# Patient Record
Sex: MALE | Race: OTHER | NOT HISPANIC OR LATINO | Employment: OTHER | URBAN - METROPOLITAN AREA
[De-identification: names, ages, dates, MRNs, and addresses within clinical notes are randomized per-mention and may not be internally consistent; named-entity substitution may affect disease eponyms.]

---

## 2018-04-12 ENCOUNTER — OFFICE VISIT (OUTPATIENT)
Dept: SLEEP CENTER | Facility: CLINIC | Age: 58
End: 2018-04-12
Payer: COMMERCIAL

## 2018-04-12 VITALS
BODY MASS INDEX: 35.28 KG/M2 | HEART RATE: 60 BPM | SYSTOLIC BLOOD PRESSURE: 138 MMHG | HEIGHT: 70 IN | WEIGHT: 246.4 LBS | DIASTOLIC BLOOD PRESSURE: 80 MMHG

## 2018-04-12 DIAGNOSIS — R06.83 SNORING: ICD-10-CM

## 2018-04-12 DIAGNOSIS — G25.81 RESTLESS LEG SYNDROME: Primary | ICD-10-CM

## 2018-04-12 DIAGNOSIS — E66.09 CLASS 2 OBESITY DUE TO EXCESS CALORIES WITHOUT SERIOUS COMORBIDITY WITH BODY MASS INDEX (BMI) OF 35.0 TO 35.9 IN ADULT: ICD-10-CM

## 2018-04-12 PROBLEM — E66.812 CLASS 2 OBESITY DUE TO EXCESS CALORIES WITHOUT SERIOUS COMORBIDITY WITH BODY MASS INDEX (BMI) OF 35.0 TO 35.9 IN ADULT: Status: ACTIVE | Noted: 2018-04-12

## 2018-04-12 PROCEDURE — 99215 OFFICE O/P EST HI 40 MIN: CPT | Performed by: PSYCHIATRY & NEUROLOGY

## 2018-04-12 RX ORDER — ROPINIROLE 0.25 MG/1
0.25 TABLET, FILM COATED ORAL 4 TIMES DAILY
Qty: 360 TABLET | Refills: 3 | Status: SHIPPED | OUTPATIENT
Start: 2018-04-12 | End: 2018-04-12 | Stop reason: SDUPTHER

## 2018-04-12 RX ORDER — SULFAMETHOXAZOLE AND TRIMETHOPRIM 800; 160 MG/1; MG/1
1 TABLET ORAL DAILY
Refills: 5 | COMMUNITY
Start: 2018-04-06 | End: 2019-04-30 | Stop reason: ALTCHOICE

## 2018-04-12 RX ORDER — EPINEPHRINE 0.3 MG/.3ML
INJECTION SUBCUTANEOUS
COMMUNITY
Start: 2011-03-24

## 2018-04-12 RX ORDER — CLOTRIMAZOLE AND BETAMETHASONE DIPROPIONATE 10; .64 MG/G; MG/G
CREAM TOPICAL
Refills: 5 | COMMUNITY
Start: 2018-01-22

## 2018-04-12 RX ORDER — UREA 10 %
0.5 LOTION (ML) TOPICAL
COMMUNITY

## 2018-04-12 RX ORDER — ROPINIROLE 0.25 MG/1
TABLET, FILM COATED ORAL
COMMUNITY
Start: 2015-09-28 | End: 2018-04-12 | Stop reason: SDUPTHER

## 2018-04-12 RX ORDER — IBUPROFEN 800 MG/1
800 TABLET ORAL EVERY 8 HOURS
COMMUNITY
Start: 2017-05-15 | End: 2021-03-18 | Stop reason: HOSPADM

## 2018-04-12 RX ORDER — ROPINIROLE 0.25 MG/1
0.25 TABLET, FILM COATED ORAL 4 TIMES DAILY
Qty: 360 TABLET | Refills: 0 | Status: SHIPPED | OUTPATIENT
Start: 2018-04-12 | End: 2019-08-15 | Stop reason: SDUPTHER

## 2018-04-12 RX ORDER — MOMETASONE FUROATE 50 UG/1
SPRAY, METERED NASAL
Refills: 3 | COMMUNITY
Start: 2018-04-06 | End: 2019-04-30

## 2018-04-12 RX ORDER — POTASSIUM BICARBONATE 25 MEQ/1
25 TABLET, EFFERVESCENT ORAL
COMMUNITY
End: 2022-04-14 | Stop reason: ALTCHOICE

## 2018-04-12 RX ORDER — BETAMETHASONE DIPROPIONATE 0.5 MG/G
CREAM TOPICAL
Refills: 5 | COMMUNITY
Start: 2018-03-01

## 2018-04-12 RX ORDER — SILODOSIN 8 MG/1
8 CAPSULE ORAL
COMMUNITY
End: 2019-04-30 | Stop reason: ALTCHOICE

## 2018-04-12 RX ORDER — TRIAMCINOLONE ACETONIDE 1 MG/G
CREAM TOPICAL
Refills: 5 | COMMUNITY
Start: 2018-01-22

## 2018-04-12 RX ORDER — MOMETASONE FUROATE 50 UG/1
2 SPRAY, METERED NASAL DAILY
COMMUNITY
Start: 2011-03-24 | End: 2019-04-30 | Stop reason: SDUPTHER

## 2018-04-12 RX ORDER — LISINOPRIL AND HYDROCHLOROTHIAZIDE 12.5; 1 MG/1; MG/1
1 TABLET ORAL DAILY
Refills: 3 | COMMUNITY
Start: 2018-01-29 | End: 2019-04-30 | Stop reason: ALTCHOICE

## 2018-04-12 RX ORDER — CLOBETASOL PROPIONATE 0.46 MG/ML
SOLUTION TOPICAL
COMMUNITY
Start: 2015-02-06

## 2018-04-12 NOTE — PATIENT INSTRUCTIONS
1   Continue ropinirole at 0 25 mg 3 or 4 times a day as needed  2  Continue mandibular advancement device for control of snoring  3  Attempt weight loss as possible  4  Continue melatonin at 0 5 mg at nighttime to control mild insomnia  5  Look for any evidence of augmentation  6    Contact the [facility] if any new problems arise

## 2018-04-12 NOTE — PROGRESS NOTES
Progress Note - [facility] YESENIA Murray 59 y o  male   :1960, MRN: [id number]  2018          Follow Up Evaluation / Problem:     Cap Restless Legs Syndrome  Snoring  Obesity    HPI: John Andrew is a 59y o  year old male  He has a history of restless legs syndrome which has been well controlled using ropinirole  He currently uses 0 25 mg tablets  He takes 3 tablets bedtime and may take an extra tablet during the day if necessary      Review of Systems      Genitourinary frequent urination, frequent urination at night and difficulity emptying your bladder   Cardiology none   Gastrointestinal none   Neurology none   Constitutional weight change of 10 or more pounds in the past year gain of 15 pounds   Integumentary none   Psychiatry none   Musculoskeletal joint pain   Pulmonary none   ENT nasal or sinus congestion and ringing in ears   Endocrine none   Hematological easy brusing       Current Outpatient Prescriptions:     aspirin 81 MG tablet, Take 1 tablet by mouth daily, Disp: , Rfl:     betamethasone dipropionate (DIPROSONE) 0 05 % cream, APPLY EVERY DAY TO FOOT, Disp: , Rfl: 5    Cholecalciferol (VITAMIN D PO), Take 1 tablet by mouth, Disp: , Rfl:     clotrimazole-betamethasone (LOTRISONE) 1-0 05 % cream, APPLY TWICE A DAY, Disp: , Rfl: 5    Coenzyme Q10 (COQ10 PO), Take by mouth, Disp: , Rfl:     EPINEPHrine (EPIPEN) 0 3 mg/0 3 mL SOAJ, Inject as directed, Disp: , Rfl:     fexofenadine (ALLEGRA) 30 MG/5ML suspension, Take 30 mg by mouth, Disp: , Rfl:     halobetasol (ULTRAVATE) 0 05 % cream, APPLY TWICE A DAY, Disp: , Rfl: 5    ibuprofen (MOTRIN) 800 mg tablet, Take 800 mg by mouth every 8 (eight) hours, Disp: , Rfl:     lisinopril-hydrochlorothiazide (PRINZIDE,ZESTORETIC) 10-12 5 MG per tablet, Take 1 tablet by mouth daily, Disp: , Rfl: 3    melatonin 1 mg, Take 0 5 mg by mouth  , Disp: , Rfl:     mometasone (NASONEX) 50 mcg/act nasal spray, USE 2 SPRAYS AT BEDTIME AS DIRECTED, Disp: , Rfl: 3    mometasone (NASONEX) 50 mcg/act nasal spray, 2 sprays into each nostril daily, Disp: , Rfl:     Multiple Vitamins-Minerals (OCUVITE ADULT 50+ PO), Take by mouth, Disp: , Rfl:     NON FORMULARY, , Disp: , Rfl:     Omega-3 Fatty Acids (OMEGA 3 PO), Take by mouth, Disp: , Rfl:     potassium bicarbonate (K-LYTE) 25 MEQ disintegrating tablet, Take 25 mEq by mouth, Disp: , Rfl:     rOPINIRole (REQUIP) 0 25 mg tablet, Take 1 tablet (0 25 mg total) by mouth 4 (four) times a day for 90 days, Disp: 360 tablet, Rfl: 0    Silodosin (RAPAFLO) 8 MG CAPS, Take 8 mg by mouth, Disp: , Rfl:     triamcinolone (KENALOG) 0 1 % cream, APPLY TWICE A DAY, Disp: , Rfl: 5    Tuberculin-Allergy Syringes (B-D ALLERGY SYRINGE 1CC/28G) 28G X 1/2" 1 ML MISC, USE 3 SYRINGES A WEEK, Disp: , Rfl:     clobetasol (TEMOVATE) 0 05 % external solution, Apply topically, Disp: , Rfl:     DOCOSAHEXAENOIC ACID PO, Take 2 capsules by mouth, Disp: , Rfl:     sulfamethoxazole-trimethoprim (BACTRIM DS) 800-160 mg per tablet, Take 1 tablet by mouth daily, Disp: , Rfl: 5    [place] Sleepiness Scale  Sitting and reading: Slight chance of dozing  Watching TV: Slight chance of dozing  Sitting, inactive in a public place (e g  a theatre or a meeting): Would never doze  As a passenger in a car for an hour without a break: Would never doze  Lying down to rest in the afternoon when circumstances permit: Slight chance of dozing  Sitting and talking to someone: Would never doze  Sitting quietly after a lunch without alcohol: Would never doze  In a car, while stopped for a few minutes in traffic: Would never doze  Total score: 3              Vitals:    04/12/18 0800   BP: 138/80   Pulse: 60   Weight: 112 kg (246 lb 6 4 oz)   Height: 5' 10" (1 778 m)       Body mass index is 35 35 kg/m²      Neck Circumference: 43 (cm)       EPWORTH SLEEPINESS SCORE  Total score: 3      Past History Since Last Sleep Center Visit:    He has continued to have excellent results with ropinirole as described above  He denies any new difficulties  He recently had an injection of his left heel for chronic pain  This seems to work quite well  Snoring continues to be fairly well controlled using an over-the-counter mandibular advancement device  He has found it necessary to change this device every 6-12 months  The review of systems and following portions of the patient's history were reviewed and updated as appropriate: allergies, current medications, past family history, past medical history, past social history, past surgical history, and problem list     OBJECTIVE      Physical Exam:     General Appearance:   Alert, cooperative, no distress, appears stated age, obese     Head:   Normocephalic, without obvious abnormality, atraumatic     Eyes:   PERRL, conjunctiva/corneas clear, EOM's intact          Nose:  Nares normal, septum midline, no drainage or sinus tenderness           Throat:  Lips, teeth and gums normal; tongue normal size and  shape and midline       Neck:  Supple, symmetrical, trachea midline, no adenopathy; Thyroid: No enlargement, tenderness or nodules; no carotid bruit or JVD     Lungs:      Clear to auscultation bilaterally, respirations unlabored     Heart:   Regular rate and rhythm, S1 and S2 normal, no murmur, rub or gallop       Extremities:  Extremities normal, atraumatic, no cyanosis or edema     Pulses:  2+ and symmetric all extremities     Skin:  Skin color, texture, turgor normal, no rashes or lesions       Neurologic:  CNII-XII intact  Normal strength, sensation throughout       ASSESSMENT / PLAN    1  Restless leg syndrome  rOPINIRole (REQUIP) 0 25 mg tablet    DISCONTINUED: rOPINIRole (REQUIP) 0 25 mg tablet   2  Snoring     3   Class 2 obesity due to excess calories without serious comorbidity with body mass index (BMI) of 35 0 to 35 9 in adult             Counseling / Coordination of Care  Total clinic time spent today 25 minutes  Greater than 50% of total time was spent with the patient and / or family counseling and / or coordination of care  A description of the counseling / coordination of care:     Impressions, Prognosis, Instructions for management, Risks and benefits of treatment and Importance of compliance with treatment    The following instructions have been given to the patient today:    Patient Instructions   1  Continue ropinirole at 0 25 mg 3 or 4 times a day as needed  2  Continue mandibular advancement device for control of snoring  3  Attempt weight loss as possible  4  Continue melatonin at 0 5 mg at nighttime to control mild insomnia  5  Look for any evidence of augmentation  6    Contact the [facility] if any new problems arise        Hever Chavarria

## 2018-04-20 ENCOUNTER — TRANSCRIBE ORDERS (OUTPATIENT)
Dept: ADMINISTRATIVE | Facility: HOSPITAL | Age: 58
End: 2018-04-20

## 2018-04-20 DIAGNOSIS — N20.1 CALCULUS OF URETER: Primary | ICD-10-CM

## 2018-04-28 ENCOUNTER — HOSPITAL ENCOUNTER (OUTPATIENT)
Dept: CT IMAGING | Facility: HOSPITAL | Age: 58
Discharge: HOME | End: 2018-04-28
Attending: UROLOGY
Payer: COMMERCIAL

## 2018-04-28 DIAGNOSIS — N20.1 CALCULUS OF URETER: ICD-10-CM

## 2018-04-28 PROCEDURE — 74176 CT ABD & PELVIS W/O CONTRAST: CPT

## 2019-08-15 ENCOUNTER — OFFICE VISIT (OUTPATIENT)
Dept: SLEEP CENTER | Facility: CLINIC | Age: 59
End: 2019-08-15
Payer: COMMERCIAL

## 2019-08-15 VITALS
WEIGHT: 245 LBS | SYSTOLIC BLOOD PRESSURE: 118 MMHG | BODY MASS INDEX: 35.07 KG/M2 | HEIGHT: 70 IN | HEART RATE: 74 BPM | DIASTOLIC BLOOD PRESSURE: 76 MMHG

## 2019-08-15 DIAGNOSIS — E66.09 CLASS 2 OBESITY DUE TO EXCESS CALORIES WITHOUT SERIOUS COMORBIDITY WITH BODY MASS INDEX (BMI) OF 35.0 TO 35.9 IN ADULT: Primary | ICD-10-CM

## 2019-08-15 DIAGNOSIS — G25.81 RESTLESS LEG SYNDROME: ICD-10-CM

## 2019-08-15 PROCEDURE — 99215 OFFICE O/P EST HI 40 MIN: CPT | Performed by: PSYCHIATRY & NEUROLOGY

## 2019-08-15 RX ORDER — ROPINIROLE 0.25 MG/1
0.25 TABLET, FILM COATED ORAL 4 TIMES DAILY
Qty: 360 TABLET | Refills: 3 | Status: SHIPPED | OUTPATIENT
Start: 2019-08-15 | End: 2020-08-04

## 2019-08-15 NOTE — PATIENT INSTRUCTIONS
1  Continue ropinirole at current schedule  2  Contact the [facility] if any difficulties arise  3  Contact the [facility] for refills as needed  4   Return in approximately 1 year for routine re-evaluation

## 2019-08-15 NOTE — PROGRESS NOTES
Progress Note -  Nawaf Sol  59 y o  male   :1960, MRN: [id number]  8/15/2019          Follow Up Evaluation / Problem:     Restless Legs Syndrome  Snoring  Obesity      Thank you for the opportunity of participating in the evaluation and care of this patient in the Sleep Clinic at [facility]  HPI: Seth Ramires is a 59y o  year old male  He has a history of restless legs syndrome which has been well controlled using ropinirole over the past several years          Current Outpatient Medications:     alfuzosin (UROXATRAL) 10 mg 24 hr tablet, Take 10 mg by mouth daily, Disp: , Rfl:     aspirin 81 MG tablet, Take 1 tablet by mouth daily, Disp: , Rfl:     betamethasone dipropionate (DIPROSONE) 0 05 % cream, APPLY EVERY DAY TO FOOT, Disp: , Rfl: 5    Cholecalciferol (VITAMIN D PO), Take 1 tablet by mouth, Disp: , Rfl:     clobetasol (TEMOVATE) 0 05 % external solution, Apply topically, Disp: , Rfl:     clotrimazole-betamethasone (LOTRISONE) 1-0 05 % cream, APPLY TWICE A DAY, Disp: , Rfl: 5    Coenzyme Q10 (COQ10 PO), Take by mouth, Disp: , Rfl:     EPINEPHrine (EPIPEN) 0 3 mg/0 3 mL SOAJ, Inject as directed, Disp: , Rfl:     fexofenadine (ALLEGRA) 30 MG/5ML suspension, Take 30 mg by mouth, Disp: , Rfl:     halobetasol (ULTRAVATE) 0 05 % cream, APPLY TWICE A DAY, Disp: , Rfl: 5    ibuprofen (MOTRIN) 800 mg tablet, Take 800 mg by mouth every 8 (eight) hours, Disp: , Rfl:     lisinopril (ZESTRIL) 10 mg tablet, Take 10 mg by mouth daily, Disp: , Rfl:     melatonin 1 mg, Take 0 5 mg by mouth  , Disp: , Rfl:     mometasone (ELOCON) 0 1 % lotion, One drop affected ear canal daily at bedtime when necessary itching, Disp: 60 mL, Rfl: 0    mometasone (NASONEX) 50 mcg/act nasal spray, 1 spray into each nostril 2 (two) times a day for 360 days, Disp: 3 Act, Rfl: 3    Multiple Vitamins-Minerals (OCUVITE ADULT 50+ PO), Take by mouth, Disp: , Rfl:     NON FORMULARY, , Disp: , Rfl:     Olopatadine HCl 0 6 % SOLN, 1-2 sprays each nostril twice a day when necessary nasal congestion, Disp: 1 Bottle, Rfl: 5    Omega-3 1000 MG CAPS, Take by mouth, Disp: , Rfl:     potassium bicarbonate (K-LYTE) 25 MEQ disintegrating tablet, Take 25 mEq by mouth, Disp: , Rfl:     triamcinolone (KENALOG) 0 1 % cream, APPLY TWICE A DAY, Disp: , Rfl: 5    Tuberculin-Allergy Syringes (B-D ALLERGY SYRINGE 1CC/28G) 28G X 1/2" 1 ML MISC, USE 3 SYRINGES A WEEK, Disp: , Rfl:     rOPINIRole (REQUIP) 0 25 mg tablet, Take 1 tablet (0 25 mg total) by mouth 4 (four) times a day for 90 days, Disp: 360 tablet, Rfl: 0    [place] Sleepiness Scale  Sitting and reading: Would never doze  Watching TV: Slight chance of dozing  Sitting, inactive in a public place (e g  a theatre or a meeting): Would never doze  As a passenger in a car for an hour without a break: Would never doze  Lying down to rest in the afternoon when circumstances permit: Would never doze  Sitting and talking to someone: Would never doze  Sitting quietly after a lunch without alcohol: Would never doze  In a car, while stopped for a few minutes in traffic: Would never doze  Total score: 1              Vitals:    08/15/19 0800   BP: 118/76   Pulse: 74   Weight: 111 kg (245 lb)   Height: 5' 10" (1 778 m)       Body mass index is 35 15 kg/m²  Neck Circumference: 17       EPWORTH SLEEPINESS SCORE  Total score: 1      Past History Since Last Sleep Center Visit:     Still doing well on ropinnirole  He currently takes 0 25 milligram tablets 3-4 times daily as needed  He is having no difficulty with his legs of present  He will continue with same dose of medication the time being  He is experiencing no evidence of side effects at this time      The review of systems and following portions of the patient's history were reviewed and updated as appropriate: allergies, current medications, past family history, past medical history, past social history, past surgical history, and problem list         OBJECTIVE      Physical Exam:     General Appearance:   Alert, cooperative, no distress, appears stated age     Head:   Normocephalic, without obvious abnormality, atraumatic     Eyes:   PERRL, conjunctiva/corneas clear, EOM's intact          Nose:  Nares normal, septum midline, no drainage or sinus tenderness     Neck:  Supple, symmetrical, trachea midline, no adenopathy; Thyroid: No enlargement, tenderness or nodules; no carotid bruit or JVD       Extremities:  Extremities normal, atraumatic, no cyanosis or edema     Pulses:  2+ and symmetric all extremities     Skin:  Skin color, texture, turgor normal, no rashes or lesions       Neurologic:  CNII-XII intact  Normal strength, sensation throughout       ASSESSMENT / PLAN    No diagnosis found  Counseling / Coordination of Care  Total clinic time spent today 25 minutes  Greater than 50% of total time was spent with the patient and / or family counseling and / or coordination of care  A description of the counseling / coordination of care:     Impressions, Prognosis, Instructions for management, Risks and benefits of treatment, Patient and family education and Risk factor reductions    The following instructions have been given to the patient today:    There are no Patient Instructions on file for this visit        Hever Hollis

## 2019-08-15 NOTE — LETTER
August 15, 2019     Roya Carson DO  [address] [id number]    Patient: John Andrew   YOB: 1960   Date of Visit: 8/15/2019       Dear Dr Sandro Valente: Thank you for referring Yulisa Dunne to me for evaluation  Below are my notes for this consultation  If you have questions, please do not hesitate to call me  I look forward to following your patient along with you           Sincerely,        Sophie Ordaz DO        CC: No Recipients

## 2020-08-01 DIAGNOSIS — G25.81 RESTLESS LEG SYNDROME: ICD-10-CM

## 2020-08-04 RX ORDER — ROPINIROLE 0.25 MG/1
TABLET, FILM COATED ORAL
Qty: 360 TABLET | Refills: 3 | Status: SHIPPED | OUTPATIENT
Start: 2020-08-04 | End: 2022-01-31 | Stop reason: SDUPTHER

## 2021-03-10 DIAGNOSIS — Z23 ENCOUNTER FOR IMMUNIZATION: ICD-10-CM

## 2021-03-12 RX ORDER — FINASTERIDE 5 MG/1
5 TABLET, FILM COATED ORAL DAILY
COMMUNITY
End: 2022-05-09 | Stop reason: ALTCHOICE

## 2021-03-12 RX ORDER — TAMSULOSIN HYDROCHLORIDE 0.4 MG/1
0.8 CAPSULE ORAL
Status: ON HOLD | COMMUNITY
End: 2022-05-17

## 2021-03-12 NOTE — PRE-PROCEDURE INSTRUCTIONS
Pre-Surgery Instructions:   Medication Instructions    alfuzosin (UROXATRAL) 10 mg 24 hr tablet Instructed patient per Anesthesia Guidelines   aspirin 81 MG tablet Patient was instructed by Physician and understands  stopped for surgery    betamethasone dipropionate (DIPROSONE) 0 05 % cream Instructed patient per Anesthesia Guidelines   Cholecalciferol (VITAMIN D PO) Instructed patient per Anesthesia Guidelines  stop 3/12    clobetasol (TEMOVATE) 0 05 % external solution Instructed patient per Anesthesia Guidelines   clotrimazole-betamethasone (LOTRISONE) 1-0 05 % cream Instructed patient per Anesthesia Guidelines   Coenzyme Q10 (COQ10 PO) Instructed patient per Anesthesia Guidelines  stop 3/12    EPINEPHrine (EPIPEN) 0 3 mg/0 3 mL SOAJ Instructed patient per Anesthesia Guidelines   fexofenadine (ALLEGRA) 30 MG/5ML suspension Instructed patient per Anesthesia Guidelines   finasteride (PROSCAR) 5 mg tablet Instructed patient per Anesthesia Guidelines   halobetasol (ULTRAVATE) 0 05 % cream Instructed patient per Anesthesia Guidelines   ibuprofen (MOTRIN) 800 mg tablet Instructed patient per Anesthesia Guidelines  stop 3/12    lisinopril (ZESTRIL) 10 mg tablet Instructed patient per Anesthesia Guidelines  HOLD 3/18    melatonin 1 mg Instructed patient per Anesthesia Guidelines  stop 3/12    mometasone (ELOCON) 0 1 % lotion Instructed patient per Anesthesia Guidelines   mometasone (NASONEX) 50 mcg/act nasal spray Instructed patient per Anesthesia Guidelines   NON FORMULARY Instructed patient per Anesthesia Guidelines   potassium bicarbonate (K-LYTE) 25 MEQ disintegrating tablet Instructed patient per Anesthesia Guidelines  stop 3/12    rOPINIRole (REQUIP) 0 25 mg tablet Instructed patient per Anesthesia Guidelines   tamsulosin (FLOMAX) 0 4 mg Instructed patient per Anesthesia Guidelines   triamcinolone (KENALOG) 0 1 % cream Instructed patient per Anesthesia Guidelines     Pre procedure instructions reviewed verbalizes understanding  NPO after MN  Stop all supplements 3/12  Morning meds with water  No NSAIDS

## 2021-03-17 ENCOUNTER — ANESTHESIA EVENT (OUTPATIENT)
Dept: PERIOP | Facility: HOSPITAL | Age: 61
End: 2021-03-17
Payer: COMMERCIAL

## 2021-03-18 ENCOUNTER — ANESTHESIA (OUTPATIENT)
Dept: PERIOP | Facility: HOSPITAL | Age: 61
End: 2021-03-18
Payer: COMMERCIAL

## 2021-03-18 ENCOUNTER — HOSPITAL ENCOUNTER (OUTPATIENT)
Facility: HOSPITAL | Age: 61
Setting detail: OUTPATIENT SURGERY
Discharge: HOME | End: 2021-03-18
Attending: UROLOGY | Admitting: UROLOGY
Payer: COMMERCIAL

## 2021-03-18 VITALS
DIASTOLIC BLOOD PRESSURE: 70 MMHG | SYSTOLIC BLOOD PRESSURE: 155 MMHG | RESPIRATION RATE: 18 BRPM | TEMPERATURE: 97.9 F | WEIGHT: 240 LBS | BODY MASS INDEX: 34.36 KG/M2 | HEART RATE: 80 BPM | HEIGHT: 70 IN | OXYGEN SATURATION: 98 %

## 2021-03-18 DIAGNOSIS — N40.1 BENIGN PROSTATIC HYPERPLASIA WITH WEAK URINARY STREAM: Primary | ICD-10-CM

## 2021-03-18 DIAGNOSIS — R39.12 BENIGN PROSTATIC HYPERPLASIA WITH WEAK URINARY STREAM: Primary | ICD-10-CM

## 2021-03-18 PROBLEM — I10 HTN (HYPERTENSION): Status: ACTIVE | Noted: 2021-03-18

## 2021-03-18 PROBLEM — N40.0 BPH (BENIGN PROSTATIC HYPERPLASIA): Status: ACTIVE | Noted: 2021-03-18

## 2021-03-18 PROCEDURE — L8699 PROSTHETIC IMPLANT NOS: HCPCS | Performed by: UROLOGY

## 2021-03-18 DEVICE — IMPLANT URO PROSTATE UROLIFT: Type: IMPLANTABLE DEVICE | Site: PROSTATE | Status: FUNCTIONAL

## 2021-03-18 RX ORDER — CIPROFLOXACIN 500 MG/1
500 TABLET, FILM COATED ORAL EVERY 12 HOURS SCHEDULED
Qty: 14 TABLET | Refills: 0 | Status: ON HOLD | OUTPATIENT
Start: 2021-03-18 | End: 2021-03-26

## 2021-03-18 RX ORDER — FENTANYL CITRATE 50 UG/ML
INJECTION, SOLUTION INTRAMUSCULAR; INTRAVENOUS AS NEEDED
Status: DISCONTINUED | OUTPATIENT
Start: 2021-03-18 | End: 2021-03-18

## 2021-03-18 RX ORDER — ONDANSETRON 2 MG/ML
INJECTION INTRAMUSCULAR; INTRAVENOUS AS NEEDED
Status: DISCONTINUED | OUTPATIENT
Start: 2021-03-18 | End: 2021-03-18

## 2021-03-18 RX ORDER — PROPOFOL 10 MG/ML
INJECTION, EMULSION INTRAVENOUS CONTINUOUS PRN
Status: DISCONTINUED | OUTPATIENT
Start: 2021-03-18 | End: 2021-03-18

## 2021-03-18 RX ORDER — MAGNESIUM HYDROXIDE 1200 MG/15ML
LIQUID ORAL AS NEEDED
Status: DISCONTINUED | OUTPATIENT
Start: 2021-03-18 | End: 2021-03-18 | Stop reason: HOSPADM

## 2021-03-18 RX ORDER — FENTANYL CITRATE/PF 50 MCG/ML
25 SYRINGE (ML) INJECTION
Status: DISCONTINUED | OUTPATIENT
Start: 2021-03-18 | End: 2021-03-18 | Stop reason: HOSPADM

## 2021-03-18 RX ORDER — PROPOFOL 10 MG/ML
INJECTION, EMULSION INTRAVENOUS AS NEEDED
Status: DISCONTINUED | OUTPATIENT
Start: 2021-03-18 | End: 2021-03-18

## 2021-03-18 RX ORDER — ONDANSETRON 2 MG/ML
4 INJECTION INTRAMUSCULAR; INTRAVENOUS EVERY 6 HOURS PRN
Status: DISCONTINUED | OUTPATIENT
Start: 2021-03-18 | End: 2021-03-18 | Stop reason: HOSPADM

## 2021-03-18 RX ORDER — ACETAMINOPHEN 325 MG/1
650 TABLET ORAL EVERY 6 HOURS PRN
Status: DISCONTINUED | OUTPATIENT
Start: 2021-03-18 | End: 2021-03-18 | Stop reason: HOSPADM

## 2021-03-18 RX ORDER — ONDANSETRON 2 MG/ML
4 INJECTION INTRAMUSCULAR; INTRAVENOUS ONCE AS NEEDED
Status: DISCONTINUED | OUTPATIENT
Start: 2021-03-18 | End: 2021-03-18 | Stop reason: HOSPADM

## 2021-03-18 RX ORDER — MIDAZOLAM HYDROCHLORIDE 2 MG/2ML
INJECTION, SOLUTION INTRAMUSCULAR; INTRAVENOUS AS NEEDED
Status: DISCONTINUED | OUTPATIENT
Start: 2021-03-18 | End: 2021-03-18

## 2021-03-18 RX ORDER — ATORVASTATIN CALCIUM 10 MG/1
10 TABLET, FILM COATED ORAL DAILY
COMMUNITY

## 2021-03-18 RX ORDER — SODIUM CHLORIDE 9 MG/ML
125 INJECTION, SOLUTION INTRAVENOUS CONTINUOUS
Status: DISCONTINUED | OUTPATIENT
Start: 2021-03-18 | End: 2021-03-18 | Stop reason: HOSPADM

## 2021-03-18 RX ORDER — KETOROLAC TROMETHAMINE 30 MG/ML
INJECTION, SOLUTION INTRAMUSCULAR; INTRAVENOUS AS NEEDED
Status: DISCONTINUED | OUTPATIENT
Start: 2021-03-18 | End: 2021-03-18

## 2021-03-18 RX ORDER — OXYCODONE HYDROCHLORIDE AND ACETAMINOPHEN 5; 325 MG/1; MG/1
1 TABLET ORAL EVERY 4 HOURS PRN
Status: DISCONTINUED | OUTPATIENT
Start: 2021-03-18 | End: 2021-03-18 | Stop reason: HOSPADM

## 2021-03-18 RX ORDER — FUROSEMIDE 10 MG/ML
INJECTION INTRAMUSCULAR; INTRAVENOUS AS NEEDED
Status: DISCONTINUED | OUTPATIENT
Start: 2021-03-18 | End: 2021-03-18

## 2021-03-18 RX ORDER — CIPROFLOXACIN 2 MG/ML
400 INJECTION, SOLUTION INTRAVENOUS ONCE
Status: COMPLETED | OUTPATIENT
Start: 2021-03-18 | End: 2021-03-18

## 2021-03-18 RX ADMIN — PROPOFOL 50 MG: 10 INJECTION, EMULSION INTRAVENOUS at 14:11

## 2021-03-18 RX ADMIN — FENTANYL CITRATE 50 MCG: 50 INJECTION, SOLUTION INTRAMUSCULAR; INTRAVENOUS at 14:17

## 2021-03-18 RX ADMIN — PROPOFOL 50 MG: 10 INJECTION, EMULSION INTRAVENOUS at 14:13

## 2021-03-18 RX ADMIN — SODIUM CHLORIDE 125 ML/HR: 0.9 INJECTION, SOLUTION INTRAVENOUS at 15:00

## 2021-03-18 RX ADMIN — FUROSEMIDE 10 MG: 10 INJECTION, SOLUTION INTRAMUSCULAR; INTRAVENOUS at 14:33

## 2021-03-18 RX ADMIN — KETOROLAC TROMETHAMINE 15 MG: 30 INJECTION, SOLUTION INTRAMUSCULAR at 14:33

## 2021-03-18 RX ADMIN — MIDAZOLAM 2 MG: 1 INJECTION INTRAMUSCULAR; INTRAVENOUS at 13:59

## 2021-03-18 RX ADMIN — SODIUM CHLORIDE 125 ML/HR: 0.9 INJECTION, SOLUTION INTRAVENOUS at 13:38

## 2021-03-18 RX ADMIN — ONDANSETRON 4 MG: 2 INJECTION INTRAMUSCULAR; INTRAVENOUS at 14:34

## 2021-03-18 RX ADMIN — SODIUM CHLORIDE 125 ML/HR: 0.9 INJECTION, SOLUTION INTRAVENOUS at 15:56

## 2021-03-18 RX ADMIN — FENTANYL CITRATE 25 MCG: 50 INJECTION, SOLUTION INTRAMUSCULAR; INTRAVENOUS at 14:39

## 2021-03-18 RX ADMIN — PROPOFOL 100 MCG/KG/MIN: 10 INJECTION, EMULSION INTRAVENOUS at 14:11

## 2021-03-18 RX ADMIN — FENTANYL CITRATE 25 MCG: 50 INJECTION, SOLUTION INTRAMUSCULAR; INTRAVENOUS at 14:29

## 2021-03-18 RX ADMIN — CIPROFLOXACIN 400 MG: 2 INJECTION, SOLUTION INTRAVENOUS at 13:58

## 2021-03-18 NOTE — DISCHARGE INSTRUCTIONS
Chambers Catheter Placement and Care   WHAT YOU NEED TO KNOW:   A Chambers catheter is a sterile tube that is inserted into your bladder to drain urine  It is also called an indwelling urinary catheter  DISCHARGE INSTRUCTIONS:   Return to the emergency department if:   · Your catheter comes out  · You suddenly have material that looks like sand in the tubing or drainage bag  · No urine is draining into the bag and you have checked the system  · You have pain in your hip, back, pelvis, or lower abdomen  · You are confused or cannot think clearly  Call your doctor or urologist if:   · You have a fever  · You have bladder spasms for more than 1 day after the catheter is placed  · You see blood in the tubing or drainage bag  · You have a rash or itching where the catheter tube is secured to your skin  · Urine leaks from or around the catheter, tubing, or drainage bag  · The closed drainage system has accidently come open or apart  · You see a layer of crystals inside the tubing  · You have questions or concerns about your condition or care  Care for your catheter and drainage bag: You can reduce your risk for infection and injury by caring for your catheter and drainage bag properly  · Wash your hands often  Wash before and after you touch your catheter, tubing, or drainage bag  Use soap and water  Wear clean disposable gloves when you care for your catheter or disconnect the drainage bag  Wash your hands before you prepare or eat food  · Clean your genital area 2 times every day  Clean your catheter area and anal opening after every bowel movement  ? For men:  Use a soapy cloth to clean the tip of your penis  Start where the catheter enters  Wipe backward making sure to pull back the foreskin  Then use a cloth with clear water in the same direction to clean away the soap  ?  For women:  Use a soapy cloth to clean the area that the catheter enters your body  Make sure to separate your labia and wipe toward the anus  Then use a cloth with clear water and wipe in the same direction  · Secure the catheter tube  so you do not pull or move the catheter  This helps prevent pain and bladder spasms  Healthcare providers will show you how to use medical tape or a strap to secure the catheter tube to your body  · Keep a closed drainage system  Your catheter should always be attached to the drainage bag to form a closed system  Do not disconnect any part of the closed system unless you need to change the bag  · Keep the drainage bag below the level of your waist   This helps stop urine from moving back up the tubing and into your bladder  Do not loop or kink the tubing  This can cause urine to back up and collect in your bladder  Do not let the drainage bag touch or lie on the floor  · Empty the drainage bag when needed  The weight of a full drainage bag can be painful  Empty the drainage bag every 3 to 6 hours or when it is ? full  · Clean and change the drainage bag as directed  Ask your healthcare provider how often you should change the drainage bag and what cleaning solution to use  Wear disposable gloves when you change the bag  Do not allow the end of the catheter or tubing to touch anything  Clean the ends with an alcohol pad before you reconnect them  What to do if problems develop:   · No urine is draining into the bag:      ? Check for kinks in the tubing and straighten them out  ? Check the tape or strap used to secure the catheter tube to your skin  Make sure it is not blocking the tube  ? Make sure you are not sitting or lying on the tubing  ? Make sure the urine bag is hanging below the level of your waist     · Urine leaks from or around the catheter, tubing, or drainage bag:  Check if the closed drainage system has accidently come open or apart   Clean the catheter and tubing ends with a new alcohol pad and reconnect them     Follow up with your doctor or urologist as directed:  Write down your questions so you remember to ask them during your visits  © Copyright [address] Information is for End User's use only and may not be sold, redistributed or otherwise used for commercial purposes  All illustrations and images included in CareNotes® are the copyrighted property of [employer] D [employer] M [employer]  or [employer]  The above information is an  only  It is not intended as medical advice for individual conditions or treatments  Talk to your doctor, nurse or pharmacist before following any medical regimen to see if it is safe and effective for you  Urinary Leg Bag   WHAT YOU NEED TO KNOW:   What is a urinary leg bag? A urinary leg bag holds urine that drains from your catheter  It fits under your clothes and allows you to do your normal daily activities  How do I use a urinary leg bag? · Wash your hands  before and after you touch your catheter, tubing, or drainage bag  Use soap and water  This reduces the risk of infection  · Strap your leg bag to your thigh or calf  Make sure the straps are comfortable  The straps can cause problems with blood flow in your leg if they are too tight  · Clean the tip of the drainage tube with alcohol  before attaching it to your catheter  This helps prevent bacteria from getting into your catheter  · The connecting tube should not pull on your catheter  Skin breakdown can occur if there is constant pulling on the catheter  · Check the tube often to make sure it is not kinked or twisted  Blockage in the tube can cause urine to back up into your bladder  Your urine must flow straight through the tube into your leg bag  · Always keep the leg bag below your bladder  This prevents urine from the bag going back into your bladder, which may cause an infection  · Empty your leg bag when it is ½ full, or every 3 hours    A full bag may break or disconnect from the catheter  · Change to your bedside bag before you go to bed  Your bedside bag can hold more urine  Do not use your leg bag at night because it could become too full or break  · Clean your leg bag after every use  Fill the bag with 2 parts vinegar and 3 parts water  Let it soak for 20 minutes, then rinse and let dry  Follow your healthcare provider's instruction on replacing your leg bag with a new one  CARE AGREEMENT:   You have the right to help plan your care  Learn about your health condition and how it may be treated  Discuss treatment options with your healthcare providers to decide what care you want to receive  You always have the right to refuse treatment  The above information is an  only  It is not intended as medical advice for individual conditions or treatments  Talk to your doctor, nurse or pharmacist before following any medical regimen to see if it is safe and effective for you  © Copyright [address] Information is for End User's use only and may not be sold, redistributed or otherwise used for commercial purposes  All illustrations and images included in CareNotes® are the copyrighted property of A D [employer] M [employer]  or [address] [place] [address] [email or web address]      How to Change a Catheter Drainage Bag   WHAT YOU NEED TO KNOW:   The catheter is attached to a drainage bag that holds the urine  A large drainage bag is usually used during the night  A leg bag can be worn under your clothes during the day  The leg bag is worn around your calf or thigh  It allows you to be in public without anyone knowing you have a catheter  A leg bag will have to be emptied frequently because it is not as big as the large bag  DISCHARGE INSTRUCTIONS:   Call your doctor if:   · Your catheter comes out  · You have a fever  · You have material that looks like sand in the tubing or drainage bag  · You see blood in the tubing or drainage bag      · You see a layer of crystals inside the tubing  · You have questions or concerns about your condition or care  Attach or remove a drainage bag: You use the same steps to attach or remove a large drainage bag and a leg bag  · Gather supplies:      ? Large drainage bag    ? Clean leg bag    ? A clean towel    ? Alcohol pads    ·          · Wash your hands with soap and water  for at least 20 seconds  · Empty the drainage bag  Do not touch the tip against the container or the toilet as you empty the urine  · Place the clean towel under the connection between the catheter tube and the drainage bag tubing  The towel will catch urine that may leak out of the tubing  · Pinch and hold the catheter tubing  Disconnect the tubing from the bag by using a twisting motion  Be careful not to pull on the catheter  Place the disconnected bag on the towel  · Clean the tip of the tubing on the bag to be connected  Use an alcohol pad  Start at the tip and clean towards the bag  You may have to hold the bag tubing in the same hand as the pinched catheter tubing while you are cleaning the tip  Do not  allow the tip to touch the catheter tubing  · Connect the bag tubing to the catheter tubing  Place the tip into the catheter tubing with a twisting motion until it is securely connected  · Use straps to fasten the bag to your calf  if you are attaching a leg bag  You may need to tape the catheter tubing to your thigh  Be careful not to pull the catheter tubing tight  Damage can be done to your urethra and bladder  · Wash your hands with soap and water  for at least 20 seconds  What you need to know about drainage bags:   · Wash your hands with soap and water before and after emptying the drainage bag  · Empty your drainage bag when it is half full throughout the day  Also empty the large drainage bag when you wake in the morning or from a nap  · Make sure to clamp or twist the drainage bag closed after emptying      · Put a cap on the end of the connection tubing to prevent germs in the tubing  Prevent problems with your catheter and drainage bag:   · Drink liquids as directed  Ask your healthcare provider how much liquid to drink each day and which liquids are best for you  Liquids will help flush your kidneys and bladder to help prevent infection  · Check for kinks in the tubing and straighten them out  · Check the tape or strap used to secure the catheter tube to your skin  Make sure it is not blocking the tube  · Make sure you are not sitting or lying on the tubing  · Make sure the urine bag is hanging below the level of your waist     Follow up with your doctor as directed:  Write down your questions so you remember to ask them during your visits  © Copyright [address] Information is for End User's use only and may not be sold, redistributed or otherwise used for commercial purposes  All illustrations and images included in CareNotes® are the copyrighted property of [employer] M [employer] Inc  or [address] [place], [place] [address]sanjiv   The above information is an  only  It is not intended as medical advice for individual conditions or treatments  Talk to your doctor, nurse or pharmacist before following any medical regimen to see if it is safe and effective for you

## 2021-03-18 NOTE — INTERVAL H&P NOTE
H&P reviewed  After examining the patient I find no changes in the patients condition since the H&P had been written      Vitals:    03/18/21 1333   BP: 157/96   Pulse: 90   Resp: 19   Temp: 97 9 °F (36 6 °C)   SpO2: 95%

## 2021-03-18 NOTE — ANESTHESIA PREPROCEDURE EVALUATION
Procedure:  CYSTOSCOPY WITH INSERTION UROLIFT (N/A Bladder)    Relevant Problems   CARDIO   (+) HTN (hypertension)      /RENAL   (+) BPH (benign prostatic hyperplasia)      Other   (+) Class 2 obesity due to excess calories without serious comorbidity with body mass index (BMI) of 35 0 to 35 9 in adult   (+) Restless leg syndrome   (+) Snoring        Physical Exam    Airway    Mallampati score: II  TM Distance: >3 FB  Neck ROM: full     Dental   No notable dental hx     Cardiovascular  Rhythm: regular, Rate: normal, Cardiovascular exam normal    Pulmonary  Pulmonary exam normal Breath sounds clear to auscultation,     Other Findings        Anesthesia Plan  ASA Score- 2     Anesthesia Type- IV sedation with anesthesia and general with ASA Monitors  Additional Monitors:   Airway Plan:           Plan Factors-    Chart reviewed  Patient summary reviewed  Patient is not a current smoker  Patient instructed to abstain from smoking on day of procedure  Patient did not smoke on day of surgery  Induction- intravenous  Postoperative Plan-     Informed Consent- Anesthetic plan and risks discussed with patient

## 2021-03-18 NOTE — OP NOTE
OPERATIVE REPORT    PATIENT NAME: Ary Gardiner    :  1960  MRN: [id number]  Pt Location: [facility] OR ROOM 01    SURGERY DATE:   3/18/2021  Surgeon(s) and Role:      DO Winston Hernandes Primary    Preop Diagnosis:  Benign prostatic hyperplasia with lower urinary tract symptoms [N40 1]  Post-Op Diagnosis Codes:     Benign prostatic hyperplasia with lower urinary tract symptoms [N40 1]  Procedure(s):  CYSTOSCOPY WITH INSERTION UROLIFT implants  Four implants were placed  Two distal to the bladder neck  Two proximal to the verumontanum    Specimen(s):  None    Estimated Blood Loss:   Minimal    Drains:  Urethral Catheter Non-latex 20 Fr   (Active)   Number of days: 0       Anesthesia Type:   General    Operative Indications:  Bladder outlet obstructive symptoms refractory to maximum dose of alpha blocker therapy    Operative Findings:  Enlarged lateral lobes of the prostate bilaterally    Complications:   None known    Procedure and Technique:  Patient was identified  General anesthesia was administered  Patient was positioned in dorsal lithotomy  Genitals were prepped and draped  Time-out was taken  Rigid cystoscopy was performed  Notable findings were lateral lobe enlargement bilaterally  Bladder appeared healthy otherwise  Urolift implantation was then commenced at the left bladder neck approximately 1 5 cm distal to the bladder neck  Identical procedure was done in mirror image fashion on the right side at a similar location  Attention was then directed to the left of this side of the verumontanum where the 3rd implant was placed slightly proximal to the verumontanum  Identical procedure was done on the right side in mirror-image fashion, this represented the 4th and final implant  Visualization was done and confirmed excellent relief of bladder outlet obstruction  Twenty Portuguese Chambers catheter was placed to gravity drainage and attached to a leg bag  Patient went to recovery room having tolerated the procedure well  Catheter will be left indwelling until he comes to the office for catheter removal on Monday 3/22/2021         I was present for the entire procedure    Patient Disposition:  PACU     SIGNATURE: Bridgette Gitelman, DO  DATE: March 18, 2021  TIME: 3:01 PM

## 2021-03-23 ENCOUNTER — HOSPITAL ENCOUNTER (INPATIENT)
Facility: HOSPITAL | Age: 61
LOS: 2 days | Discharge: HOME | DRG: 717 | End: 2021-03-26
Attending: EMERGENCY MEDICINE | Admitting: STUDENT IN AN ORGANIZED HEALTH CARE EDUCATION/TRAINING PROGRAM
Payer: COMMERCIAL

## 2021-03-23 ENCOUNTER — APPOINTMENT (EMERGENCY)
Dept: CT IMAGING | Facility: HOSPITAL | Age: 61
DRG: 717 | End: 2021-03-23
Payer: COMMERCIAL

## 2021-03-23 DIAGNOSIS — N40.1 BENIGN PROSTATIC HYPERPLASIA WITH WEAK URINARY STREAM: ICD-10-CM

## 2021-03-23 DIAGNOSIS — R31.9 HEMATURIA: ICD-10-CM

## 2021-03-23 DIAGNOSIS — R33.9 URINARY RETENTION: Primary | ICD-10-CM

## 2021-03-23 DIAGNOSIS — R39.12 BENIGN PROSTATIC HYPERPLASIA WITH WEAK URINARY STREAM: ICD-10-CM

## 2021-03-23 LAB
ANION GAP SERPL CALCULATED.3IONS-SCNC: 10 MMOL/L (ref 4–13)
BACTERIA UR QL AUTO: ABNORMAL /HPF
BASOPHILS # BLD AUTO: 0.06 THOUSANDS/ΜL (ref 0–0.1)
BASOPHILS NFR BLD AUTO: 1 % (ref 0–1)
BILIRUB UR QL STRIP: NEGATIVE
BUN SERPL-MCNC: 13 MG/DL (ref 5–25)
CALCIUM SERPL-MCNC: 8.9 MG/DL (ref 8.3–10.1)
CHLORIDE SERPL-SCNC: 104 MMOL/L (ref 100–108)
CLARITY UR: ABNORMAL
CO2 SERPL-SCNC: 26 MMOL/L (ref 21–32)
COLOR UR: ABNORMAL
CREAT SERPL-MCNC: 1.18 MG/DL (ref 0.6–1.3)
EOSINOPHIL # BLD AUTO: 0.16 THOUSAND/ΜL (ref 0–0.61)
EOSINOPHIL NFR BLD AUTO: 2 % (ref 0–6)
ERYTHROCYTE [DISTWIDTH] IN BLOOD BY AUTOMATED COUNT: 12.2 % (ref 11.6–15.1)
GFR SERPL CREATININE-BSD FRML MDRD: 66 ML/MIN/1.73SQ M
GLUCOSE SERPL-MCNC: 124 MG/DL (ref 65–140)
GLUCOSE UR STRIP-MCNC: ABNORMAL MG/DL
HCT VFR BLD AUTO: 46.8 % (ref 36.5–49.3)
HGB BLD-MCNC: 16 G/DL (ref 12–17)
HGB UR QL STRIP.AUTO: ABNORMAL
IMM GRANULOCYTES # BLD AUTO: 0.05 THOUSAND/UL (ref 0–0.2)
IMM GRANULOCYTES NFR BLD AUTO: 1 % (ref 0–2)
KETONES UR STRIP-MCNC: ABNORMAL MG/DL
LEUKOCYTE ESTERASE UR QL STRIP: ABNORMAL
LYMPHOCYTES # BLD AUTO: 1.05 THOUSANDS/ΜL (ref 0.6–4.47)
LYMPHOCYTES NFR BLD AUTO: 12 % (ref 14–44)
MCH RBC QN AUTO: 32.2 PG (ref 26.8–34.3)
MCHC RBC AUTO-ENTMCNC: 34.2 G/DL (ref 31.4–37.4)
MCV RBC AUTO: 94 FL (ref 82–98)
MONOCYTES # BLD AUTO: 0.61 THOUSAND/ΜL (ref 0.17–1.22)
MONOCYTES NFR BLD AUTO: 7 % (ref 4–12)
NEUTROPHILS # BLD AUTO: 7.12 THOUSANDS/ΜL (ref 1.85–7.62)
NEUTS SEG NFR BLD AUTO: 77 % (ref 43–75)
NITRITE UR QL STRIP: ABNORMAL
NON-SQ EPI CELLS URNS QL MICRO: ABNORMAL /HPF
NRBC BLD AUTO-RTO: 0 /100 WBCS
PH UR STRIP.AUTO: 7 [PH]
PLATELET # BLD AUTO: 152 THOUSANDS/UL (ref 149–390)
PMV BLD AUTO: 10.7 FL (ref 8.9–12.7)
POTASSIUM SERPL-SCNC: 4 MMOL/L (ref 3.5–5.3)
PROT UR STRIP-MCNC: >=300 MG/DL
RBC # BLD AUTO: 4.97 MILLION/UL (ref 3.88–5.62)
RBC #/AREA URNS AUTO: ABNORMAL /HPF
SODIUM SERPL-SCNC: 140 MMOL/L (ref 136–145)
SP GR UR STRIP.AUTO: 1.02 (ref 1–1.03)
UROBILINOGEN UR QL STRIP.AUTO: 1 E.U./DL
WBC # BLD AUTO: 9.05 THOUSAND/UL (ref 4.31–10.16)
WBC #/AREA URNS AUTO: ABNORMAL /HPF

## 2021-03-23 PROCEDURE — 36415 COLL VENOUS BLD VENIPUNCTURE: CPT | Performed by: PHYSICIAN ASSISTANT

## 2021-03-23 PROCEDURE — 3C1ZX8Z IRRIGATION OF INDWELLING DEVICE USING IRRIGATING SUBSTANCE, EXTERNAL APPROACH: ICD-10-PCS | Performed by: UROLOGY

## 2021-03-23 PROCEDURE — 0T9B70Z DRAINAGE OF BLADDER WITH DRAINAGE DEVICE, VIA NATURAL OR ARTIFICIAL OPENING: ICD-10-PCS | Performed by: UROLOGY

## 2021-03-23 PROCEDURE — 3C1ZX8Z IRRIGATION OF INDWELLING DEVICE USING IRRIGATING SUBSTANCE, EXTERNAL APPROACH: ICD-10-PCS | Performed by: EMERGENCY MEDICINE

## 2021-03-23 PROCEDURE — 85025 COMPLETE CBC W/AUTO DIFF WBC: CPT | Performed by: PHYSICIAN ASSISTANT

## 2021-03-23 PROCEDURE — 87086 URINE CULTURE/COLONY COUNT: CPT | Performed by: PHYSICIAN ASSISTANT

## 2021-03-23 PROCEDURE — 74178 CT ABD&PLV WO CNTR FLWD CNTR: CPT

## 2021-03-23 PROCEDURE — 99220 PR INITIAL OBSERVATION CARE/DAY 70 MINUTES: CPT | Performed by: STUDENT IN AN ORGANIZED HEALTH CARE EDUCATION/TRAINING PROGRAM

## 2021-03-23 PROCEDURE — 80048 BASIC METABOLIC PNL TOTAL CA: CPT | Performed by: PHYSICIAN ASSISTANT

## 2021-03-23 PROCEDURE — 81001 URINALYSIS AUTO W/SCOPE: CPT | Performed by: PHYSICIAN ASSISTANT

## 2021-03-23 PROCEDURE — 99285 EMERGENCY DEPT VISIT HI MDM: CPT

## 2021-03-23 PROCEDURE — 99285 EMERGENCY DEPT VISIT HI MDM: CPT | Performed by: PHYSICIAN ASSISTANT

## 2021-03-23 RX ORDER — SODIUM CHLORIDE 9 MG/ML
75 INJECTION, SOLUTION INTRAVENOUS CONTINUOUS
Status: DISCONTINUED | OUTPATIENT
Start: 2021-03-23 | End: 2021-03-26 | Stop reason: HOSPADM

## 2021-03-23 RX ORDER — ROPINIROLE 0.25 MG/1
0.75 TABLET, FILM COATED ORAL
Status: DISCONTINUED | OUTPATIENT
Start: 2021-03-23 | End: 2021-03-26 | Stop reason: HOSPADM

## 2021-03-23 RX ORDER — CIPROFLOXACIN 500 MG/1
500 TABLET, FILM COATED ORAL EVERY 12 HOURS SCHEDULED
Status: COMPLETED | OUTPATIENT
Start: 2021-03-23 | End: 2021-03-24

## 2021-03-23 RX ORDER — LANOLIN ALCOHOL/MO/W.PET/CERES
1.5 CREAM (GRAM) TOPICAL
Status: DISCONTINUED | OUTPATIENT
Start: 2021-03-23 | End: 2021-03-26 | Stop reason: HOSPADM

## 2021-03-23 RX ORDER — ATORVASTATIN CALCIUM 10 MG/1
10 TABLET, FILM COATED ORAL
Status: DISCONTINUED | OUTPATIENT
Start: 2021-03-23 | End: 2021-03-26 | Stop reason: HOSPADM

## 2021-03-23 RX ORDER — FLUTICASONE PROPIONATE 50 MCG
1 SPRAY, SUSPENSION (ML) NASAL DAILY
Status: DISCONTINUED | OUTPATIENT
Start: 2021-03-23 | End: 2021-03-26 | Stop reason: HOSPADM

## 2021-03-23 RX ORDER — OXYBUTYNIN CHLORIDE 5 MG/1
5 TABLET ORAL 3 TIMES DAILY
Status: DISCONTINUED | OUTPATIENT
Start: 2021-03-23 | End: 2021-03-26 | Stop reason: HOSPADM

## 2021-03-23 RX ORDER — FINASTERIDE 5 MG/1
5 TABLET, FILM COATED ORAL DAILY
Status: DISCONTINUED | OUTPATIENT
Start: 2021-03-23 | End: 2021-03-26 | Stop reason: HOSPADM

## 2021-03-23 RX ORDER — LORATADINE 10 MG/1
5 TABLET ORAL DAILY
Status: DISCONTINUED | OUTPATIENT
Start: 2021-03-23 | End: 2021-03-26 | Stop reason: HOSPADM

## 2021-03-23 RX ORDER — LISINOPRIL 10 MG/1
10 TABLET ORAL DAILY
Status: DISCONTINUED | OUTPATIENT
Start: 2021-03-23 | End: 2021-03-26 | Stop reason: HOSPADM

## 2021-03-23 RX ORDER — TAMSULOSIN HYDROCHLORIDE 0.4 MG/1
0.4 CAPSULE ORAL
Status: DISCONTINUED | OUTPATIENT
Start: 2021-03-23 | End: 2021-03-26 | Stop reason: HOSPADM

## 2021-03-23 RX ADMIN — CIPROFLOXACIN HYDROCHLORIDE 500 MG: 500 TABLET, FILM COATED ORAL at 13:47

## 2021-03-23 RX ADMIN — OXYBUTYNIN CHLORIDE 5 MG: 5 TABLET ORAL at 22:17

## 2021-03-23 RX ADMIN — OXYBUTYNIN CHLORIDE 5 MG: 5 TABLET ORAL at 10:40

## 2021-03-23 RX ADMIN — LISINOPRIL 10 MG: 10 TABLET ORAL at 13:47

## 2021-03-23 RX ADMIN — FLUTICASONE PROPIONATE 1 SPRAY: 50 SPRAY, METERED NASAL at 22:40

## 2021-03-23 RX ADMIN — CIPROFLOXACIN HYDROCHLORIDE 500 MG: 500 TABLET, FILM COATED ORAL at 22:10

## 2021-03-23 RX ADMIN — ATORVASTATIN CALCIUM 10 MG: 10 TABLET, FILM COATED ORAL at 16:49

## 2021-03-23 RX ADMIN — ROPINIROLE 0.75 MG: 0.25 TABLET, FILM COATED ORAL at 22:12

## 2021-03-23 RX ADMIN — LORATADINE 5 MG: 10 TABLET ORAL at 13:47

## 2021-03-23 RX ADMIN — SODIUM CHLORIDE 75 ML/HR: 0.9 INJECTION, SOLUTION INTRAVENOUS at 12:24

## 2021-03-23 RX ADMIN — MELATONIN 1.5 MG: at 22:11

## 2021-03-23 RX ADMIN — FINASTERIDE 5 MG: 5 TABLET, FILM COATED ORAL at 13:47

## 2021-03-23 RX ADMIN — IOHEXOL 100 ML: 350 INJECTION, SOLUTION INTRAVENOUS at 08:53

## 2021-03-23 RX ADMIN — OXYBUTYNIN CHLORIDE 5 MG: 5 TABLET ORAL at 16:49

## 2021-03-23 RX ADMIN — TAMSULOSIN HYDROCHLORIDE 0.4 MG: 0.4 CAPSULE ORAL at 16:49

## 2021-03-23 NOTE — ASSESSMENT & PLAN NOTE
56year old male with BPH / bladder outlet obstructive symproms refractory to maximum dose of alpha blocker therapy who recently underwent cystoscopy with insertion urolift implants presented due to hematuria    - Evaluated by urology in the ED    - Continuous bladder irrigation started in the ED  - Will await for further urology recommendations  - Continue supportive care

## 2021-03-23 NOTE — H&P
[facility]  H&P- Julieta Barrientos 1960, 56 y o  male MRN: [id number]  Unit/Bed#: E5 -01 Encounter: [id number]  Primary Care Provider: Viviane Payton DO   Date and time admitted to hospital: 3/23/2021  5:45 AM    * Hematuria  Assessment & Plan  56year old male with BPH / bladder outlet obstructive symproms refractory to maximum dose of alpha blocker therapy who recently underwent cystoscopy with insertion urolift implants presented due to hematuria  - Evaluated by urology in the ED    - Continuous bladder irrigation started in the ED  - Will await for further urology recommendations  - Continue supportive care    HTN (hypertension)  Assessment & Plan  Continue lisinopril    BPH (benign prostatic hyperplasia)  Assessment & Plan  Continue flomax, and ciprofloxacin for recent intervention    Restless leg syndrome  Assessment & Plan  Continue requip      VTE Prophylaxis: Pharmacologic VTE Prophylaxis contraindicated due to hematuria  / sequential compression device   Code Status: full code  Discussion with family: wife    Anticipated Length of Stay:  Patient will be admitted on an Observation basis with an anticipated length of stay of  < 2 midnights  Justification for Hospital Stay:  Continuous bladder irrigation, Urology evaluation, CBC monitoring    Total Time for Visit, including Counseling / Coordination of Care: 75 minutes  Greater than 50% of this total time spent on direct patient counseling and coordination of care  Chief Complaint:   hematuria    History of Present Illness:    Julieta Barrientos is a 56 y o  male who presents with hematuria  Patient is a 64-year-old male who has a past medical history of hypertension, RLS, BPH and bladder outlet obstructive symptoms refractory to maximum dose of alpha-blocker therapy who recently underwent cystoscopy with insertion your left implants    He was discharged with a Chambers catheter in place and was removed yesterday  Initially he did not have any issues  Since his discharge, he states that everything was well until the night prior to admission when he woke up at 1:30 a m  In the morning with difficulty urination and lower abdominal pressure  After a while he was able to urinate but this was accompanied by a large clot  He had no issues urinating for a while until 430 in the morning where he started developing urinary retention  He was seen in the ED for further evaluation and Urology was consulted  He was irrigated and continues bladder irrigation was started  He was admitted under slim for further monitoring and supportive care  Review of Systems:    Review of Systems   Constitutional: Negative for activity change, chills, fatigue and fever  HENT: Negative for ear pain  Eyes: Negative for pain  Respiratory: Negative for apnea, cough, choking, chest tightness, shortness of breath and wheezing  Cardiovascular: Negative for chest pain and palpitations  Gastrointestinal: Positive for abdominal pain (Resolved)  Negative for diarrhea, nausea and vomiting  Genitourinary: Positive for difficulty urinating, hematuria and urgency  Negative for flank pain  Musculoskeletal: Negative for arthralgias  Skin: Negative for color change and pallor  Neurological: Negative for seizures, syncope, weakness and numbness  Psychiatric/Behavioral: Negative for agitation and confusion         Past Medical and Surgical History:     Past Medical History:   Diagnosis Date    Eczema     Enlarged prostate     Environmental allergies     Hypertension     RLS (restless legs syndrome)        Past Surgical History:   Procedure Laterality Date    COLONOSCOPY      HEMORROIDECTOMY      KIDNEY STONE SURGERY      NASAL SEPTUM SURGERY      IN CYSTOURETHRO W/IMPLANT N/A 3/18/2021    Procedure: CYSTOSCOPY WITH INSERTION UROLIFT;  Surgeon: Betty Rivera DO;  Location: [facility];  Service: Urology    TONSILLECTOMY      URETERAL EXPLORATION      stricture    UVULOPALATOPHARYNGOPLASTY      VARICOSE VEIN SURGERY      VASCULAR SURGERY         Meds/Allergies:    Prior to Admission medications    Medication Sig Start Date End Date Taking?  Authorizing Provider   alfuzosin (UROXATRAL) 10 mg 24 hr tablet Take 10 mg by mouth daily    Historical Provider, MD   atorvastatin (LIPITOR) 10 mg tablet Take 10 mg by mouth daily    Historical Provider, MD   betamethasone dipropionate (DIPROSONE) 0 05 % cream APPLY EVERY DAY TO FOOT 3/1/18   Historical Provider, MD   Cholecalciferol (VITAMIN D PO) Take 1 tablet by mouth    Historical Provider, MD   ciprofloxacin (CIPRO) 500 mg tablet Take 1 tablet (500 mg total) by mouth every 12 (twelve) hours for 7 days 3/18/21 3/25/21  Martinez Kimble DO   clobetasol (TEMOVATE) 0 05 % external solution Apply topically 2/6/15   Historical Provider, MD   clotrimazole-betamethasone (LOTRISONE) 1-0 05 % cream APPLY TWICE A DAY 1/22/18   Historical Provider, MD   Coenzyme Q10 (COQ10 PO) Take by mouth    Historical Provider, MD   EPINEPHrine (EPIPEN) 0 3 mg/0 3 mL SOAJ Inject as directed 3/24/11   Historical Provider, MD   fexofenadine (ALLEGRA) 30 MG/5ML suspension Take 30 mg by mouth    Historical Provider, MD   finasteride (PROSCAR) 5 mg tablet Take 5 mg by mouth daily    Historical Provider, MD   halobetasol (ULTRAVATE) 0 05 % cream APPLY TWICE A DAY 1/22/18   Historical Provider, MD   lisinopril (ZESTRIL) 10 mg tablet Take 10 mg by mouth daily    Historical Provider, MD   melatonin 1 mg Take 0 5 mg by mouth      Historical Provider, MD   mometasone (ELOCON) 0 1 % lotion One drop affected ear canal daily at bedtime when necessary itching 8/18/20   Chepe Rader MD   mometasone (NASONEX) 50 mcg/act nasal spray 1 spray into each nostril 2 (two) times a day 8/20/20 8/15/21  Chepe Rader MD   Multiple Vitamins-Minerals (OCUVITE ADULT 50+ PO) Take by mouth    Historical Provider, MD Cornell Rinne Historical Provider, MD   Olopatadine HCl 0 6 % SOLN 1-2 sprays each nostril twice a day when necessary nasal congestion 19   Kareem Louis MD   potassium bicarbonate (K-LYTE) 25 MEQ disintegrating tablet Take 25 mEq by mouth    Historical Provider, MD   rOPINIRole (REQUIP) 0 25 mg tablet TAKE 1 TABLET BY MOUTH 4 TIMES A DAY 20   Sohail Schillings, DO   tamsulosin (FLOMAX) 0 4 mg Take 0 4 mg by mouth daily with dinner    Historical Provider, MD   triamcinolone (KENALOG) 0 1 % cream APPLY TWICE A DAY 18   Historical Provider, MD   Tuberculin-Allergy Syringes (B-D ALLERGY SYRINGE 1CC/28G) 28G X 1/2" 1 ML MISC 3 syringes Parra every 3 weeks 20   Kareem Louis MD     I have reviewed home medications with patient personally  Allergies:    Allergies   Allergen Reactions    Food Color Lisa Neat Dye] Hives     orange flavored metamucil    Latex Itching     Band aides    Other Other (See Comments)     Seasonal rag weed dust mities    Telithromycin Other (See Comments)     BP dropped very low    Adhesive  [Medical Tape] Rash       Social History:     Marital Status: /Civil Union     Social History     Substance and Sexual Activity   Alcohol Use Yes    Drinks per session: 1 or 2    Binge frequency: Weekly     Social History     Tobacco Use   Smoking Status Former Smoker    Types: Cigars    Quit date: 2013    Years since quittin 9   Smokeless Tobacco Former User     Social History     Substance and Sexual Activity   Drug Use No       Family History:    Family History   Problem Relation Age of Onset    Stroke Mother     Lung cancer Father     Cancer Father     Other Maternal Grandmother         Cardiac    Diabetes Paternal Grandmother     Other Paternal Grandfather         Cardiac       Physical Exam:     Vitals:   Blood Pressure: 154/93 (21 1511)  Pulse: 70 (21 1511)  Temperature: 97 9 °F (36 6 °C) (21 1511)  Temp Source: Temporal (21 1511)  Respirations: 16 (03/23/21 1511)  SpO2: 97 % (03/23/21 1511)    Physical Exam  Vitals signs reviewed  Constitutional:       General: He is not in acute distress  Appearance: He is not ill-appearing  HENT:      Head: Normocephalic  Nose: Nose normal       Mouth/Throat:      Mouth: Mucous membranes are moist    Eyes:      General: No scleral icterus  Extraocular Movements: Extraocular movements intact  Cardiovascular:      Rate and Rhythm: Normal rate and regular rhythm  Heart sounds: No murmur  Pulmonary:      Effort: Pulmonary effort is normal  No respiratory distress  Breath sounds: No wheezing or rales  Abdominal:      General: There is no distension  Palpations: Abdomen is soft  Tenderness: There is no abdominal tenderness  There is no guarding  Genitourinary:     Comments: Dried Blood in urethral meatus, dickey in place, attached to cbi  Musculoskeletal: Normal range of motion  Right lower leg: No edema  Left lower leg: No edema  Skin:     General: Skin is warm  Neurological:      General: No focal deficit present  Mental Status: He is alert and oriented to person, place, and time  Psychiatric:         Mood and Affect: Mood normal          Behavior: Behavior normal        Additional Data:     Lab Results: I have personally reviewed pertinent reports  Results from last 7 days   Lab Units 03/23/21  0739   WBC Thousand/uL 9 05   HEMOGLOBIN g/dL 16 0   HEMATOCRIT % 46 8   PLATELETS Thousands/uL 152   NEUTROS PCT % 77*   LYMPHS PCT % 12*   MONOS PCT % 7   EOS PCT % 2     Results from last 7 days   Lab Units 03/23/21  0739   SODIUM mmol/L 140   POTASSIUM mmol/L 4 0   CHLORIDE mmol/L 104   CO2 mmol/L 26   BUN mg/dL 13   CREATININE mg/dL 1 18   ANION GAP mmol/L 10   CALCIUM mg/dL 8 9   GLUCOSE RANDOM mg/dL 124                       Imaging: I have personally reviewed pertinent reports        CT renal protocol   Final Result by Gloria Linares MD (03/23 0431)      1  Chambers catheter noted within the urinary bladder with a large intraluminal filling defect most consistent with large blood clot  (Approximately 7 4 x 8 3 x 5 8 cm)   2  Patient status post Urolift procedure  The 2 left-sided linear radiopaque densities project slightly beyond the anterior prostate margin toward the posterior wall of the urinary bladder however do not project into the bladder lumen   3  No hydronephrosis  Bilateral renal cysts  4  Colonic diverticulosis         Workstation performed: [id number]             EKG, Pathology, and Other Studies Reviewed on Admission:   · EKG: None    Allscripts / Epic Records Reviewed: No     ** Please Note: This note has been constructed using a voice recognition system   **

## 2021-03-23 NOTE — ED NOTES
Pt was manually irrigated by this nurse and then by Dr Anitra Napoles   Pt tolerated total used for irrigation approximately 2,500ml      Jp Coats, RN  03/23/21 9761

## 2021-03-23 NOTE — PLAN OF CARE
Problem: PAIN - ADULT  Goal: Verbalizes/displays adequate comfort level or baseline comfort level  Description: Interventions:  - Encourage patient to monitor pain and request assistance  - Assess pain using appropriate pain scale  - Administer analgesics based on type and severity of pain and evaluate response  - Implement non-pharmacological measures as appropriate and evaluate response  - Consider cultural and social influences on pain and pain management  - Notify physician/advanced practitioner if interventions unsuccessful or patient reports new pain  Outcome: Progressing     Problem: INFECTION - ADULT  Goal: Absence or prevention of progression during hospitalization  Description: INTERVENTIONS:  - Assess and monitor for signs and symptoms of infection  - Monitor lab/diagnostic results  - Monitor all insertion sites, i e  indwelling lines, tubes, and drains  - Monitor endotracheal if appropriate and nasal secretions for changes in amount and color  - [place] appropriate cooling/warming therapies per order  - Administer medications as ordered  - Instruct and encourage patient and family to use good hand hygiene technique  - Identify and instruct in appropriate isolation precautions for identified infection/condition  Outcome: Progressing  Goal: Absence of fever/infection during neutropenic period  Description: INTERVENTIONS:  - Monitor WBC    Outcome: Progressing     Problem: SAFETY ADULT  Goal: Patient will remain free of falls  Description: INTERVENTIONS:  - Assess patient frequently for physical needs  -  Identify cognitive and physical deficits and behaviors that affect risk of falls    -  [place] fall precautions as indicated by assessment   - Educate patient/family on patient safety including physical limitations  - Instruct patient to call for assistance with activity based on assessment  - Modify environment to reduce risk of injury  - Consider OT/PT consult to assist with strengthening/mobility  Outcome: Progressing  Goal: Maintain or return to baseline ADL function  Description: INTERVENTIONS:  -  Assess patient's ability to carry out ADLs; assess patient's baseline for ADL function and identify physical deficits which impact ability to perform ADLs (bathing, care of mouth/teeth, toileting, grooming, dressing, etc )  - Assess/evaluate cause of self-care deficits   - Assess range of motion  - Assess patient's mobility; develop plan if impaired  - Assess patient's need for assistive devices and provide as appropriate  - Encourage maximum independence but intervene and supervise when necessary  - Involve family in performance of ADLs  - Assess for home care needs following discharge   - Consider OT consult to assist with ADL evaluation and planning for discharge  - Provide patient education as appropriate  Outcome: Progressing  Goal: Maintain or return mobility status to optimal level  Description: INTERVENTIONS:  - Assess patient's baseline mobility status (ambulation, transfers, stairs, etc )    - Identify cognitive and physical deficits and behaviors that affect mobility  - Identify mobility aids required to assist with transfers and/or ambulation (gait belt, sit-to-stand, lift, walker, cane, etc )  - [place] fall precautions as indicated by assessment  - Record patient progress and toleration of activity level on Mobility SBAR; progress patient to next Phase/Stage  - Instruct patient to call for assistance with activity based on assessment  - Consider rehabilitation consult to assist with strengthening/weightbearing, etc   Outcome: Progressing     Problem: DISCHARGE PLANNING  Goal: Discharge to home or other facility with appropriate resources  Description: INTERVENTIONS:  - Identify barriers to discharge w/patient and caregiver  - Arrange for needed discharge resources and transportation as appropriate  - Identify discharge learning needs (meds, wound care, etc )  - Arrange for interpretive services to assist at discharge as needed  - Refer to Case Management Department for coordinating discharge planning if the patient needs post-hospital services based on physician/advanced practitioner order or complex needs related to functional status, cognitive ability, or social support system  Outcome: Progressing     Problem: Knowledge Deficit  Goal: Patient/family/caregiver demonstrates understanding of disease process, treatment plan, medications, and discharge instructions  Description: Complete learning assessment and assess knowledge base    Interventions:  - Provide teaching at level of understanding  - Provide teaching via preferred learning methods  Outcome: Progressing

## 2021-03-23 NOTE — ED NOTES
CBI started at this time        Merced Gould, [facility]  03/23/21 [id number]

## 2021-03-23 NOTE — ED PROVIDER NOTES
History  Chief Complaint   Patient presents with    Urinary Retention     Pt arrives from home reporting he had his catheter removed yesterday at 1430 post urolyft  Pt was able to urinate when he arrived home and passing clots  Pt reports at 0130 this morning he was woken from his sleep with severe pressure and urge to urinate leadin g to the expell of multiple large clots  At 230 pt states he urinated "smostly clear with small clots" and since has not been able to urinate  Pt reports pressure and bladder spasms at this time  60y  o male with eczema, BPH, HTN and RLS presents to the ER for urinary retention since this morning  Patient states he had a Urolift procedure on 3/18 by Dr Sam Begun  He had a dickey placed after the procedure that was removed yesterday  Patient states at first he was able to urinate without any problems but was passing clots  Patient states this morning, he woke up around 0130 to urinate and had lower abdominal pressure and urgency  Patient states he was able to urinate and pass a large clot  He was about to urinate once more around 0430 but then has not been able to urinate since then  Patient reports spasming, urgency and lower abdominal pressure  Symptoms are constant  He denies fever, chills, URI symptoms, chest pain, dyspnea, N/V/D, dysuria, frequency, weakness or paresthesias  History provided by:  Patient   used: No        Prior to Admission Medications   Prescriptions Last Dose Informant Patient Reported? Taking?    Cholecalciferol (VITAMIN D PO)  Self Yes No   Sig: Take 1 tablet by mouth   Coenzyme Q10 (COQ10 PO)  Self Yes No   Sig: Take by mouth   EPINEPHrine (EPIPEN) 0 3 mg/0 3 mL SOAJ  Self Yes No   Sig: Inject as directed   Multiple Vitamins-Minerals (OCUVITE ADULT 50+ PO)  Self Yes No   Sig: Take by mouth   NON FORMULARY  Self Yes No   Olopatadine HCl 0 6 % SOLN   No No   Si-2 sprays each nostril twice a day when necessary nasal congestion Tuberculin-Allergy Syringes (B-D ALLERGY SYRINGE 1CC/28G) 28G X 1/2" 1 ML MISC   No No   Sig: 3 syringes Parra every 3 weeks   alfuzosin (UROXATRAL) 10 mg 24 hr tablet  Self Yes No   Sig: Take 10 mg by mouth daily   atorvastatin (LIPITOR) 10 mg tablet   Yes No   Sig: Take 10 mg by mouth daily   betamethasone dipropionate (DIPROSONE) 0 05 % cream  Self Yes No   Sig: APPLY EVERY DAY TO FOOT   ciprofloxacin (CIPRO) 500 mg tablet   No No   Sig: Take 1 tablet (500 mg total) by mouth every 12 (twelve) hours for 7 days   clobetasol (TEMOVATE) 0 05 % external solution  Self Yes No   Sig: Apply topically   clotrimazole-betamethasone (LOTRISONE) 1-0 05 % cream  Self Yes No   Sig: APPLY TWICE A DAY   fexofenadine (ALLEGRA) 30 MG/5ML suspension  Self Yes No   Sig: Take 30 mg by mouth   finasteride (PROSCAR) 5 mg tablet   Yes No   Sig: Take 5 mg by mouth daily   halobetasol (ULTRAVATE) 0 05 % cream  Self Yes No   Sig: APPLY TWICE A DAY   lisinopril (ZESTRIL) 10 mg tablet  Self Yes No   Sig: Take 10 mg by mouth daily   melatonin 1 mg  Self Yes No   Sig: Take 0 5 mg by mouth     mometasone (ELOCON) 0 1 % lotion   No No   Sig: One drop affected ear canal daily at bedtime when necessary itching   mometasone (NASONEX) 50 mcg/act nasal spray   No No   Si spray into each nostril 2 (two) times a day   potassium bicarbonate (K-LYTE) 25 MEQ disintegrating tablet  Self Yes No   Sig: Take 25 mEq by mouth   rOPINIRole (REQUIP) 0 25 mg tablet   No No   Sig: TAKE 1 TABLET BY MOUTH 4 TIMES A DAY   tamsulosin (FLOMAX) 0 4 mg   Yes No   Sig: Take 0 4 mg by mouth daily with dinner   triamcinolone (KENALOG) 0 1 % cream  Self Yes No   Sig: APPLY TWICE A DAY      Facility-Administered Medications: None       Past Medical History:   Diagnosis Date    Eczema     Enlarged prostate     Environmental allergies     Hypertension     RLS (restless legs syndrome)        Past Surgical History:   Procedure Laterality Date    COLONOSCOPY      HEMORROIDECTOMY      KIDNEY STONE SURGERY      NASAL SEPTUM SURGERY      ND CYSTOURETHRO W/IMPLANT N/A 3/18/2021    Procedure: CYSTOSCOPY WITH INSERTION UROLIFT;  Surgeon: Leida Denise DO;  Location: [facility];  Service: Urology    TONSILLECTOMY      URETERAL EXPLORATION      stricture    UVULOPALATOPHARYNGOPLASTY      VARICOSE VEIN SURGERY      VASCULAR SURGERY         Family History   Problem Relation Age of Onset    Stroke Mother     Lung cancer Father     Cancer Father     Other Maternal Grandmother         Cardiac    Diabetes Paternal Grandmother     Other Paternal Grandfather         Cardiac     I have reviewed and agree with the history as documented  E-Cigarette/Vaping     E-Cigarette/Vaping Substances    Nicotine No     THC No     CBD No     Flavoring No     Other No     Unknown No      Social History     Tobacco Use    Smoking status: Former Smoker     Types: Cigars     Quit date: 2013     Years since quittin 9    Smokeless tobacco: Former User   Substance Use Topics    Alcohol use: Yes     Drinks per session: 1 or 2     Binge frequency: Weekly    Drug use: No       Review of Systems   Constitutional: Negative for activity change, appetite change, chills and fever  HENT: Negative for congestion, drooling, ear discharge, ear pain, facial swelling, rhinorrhea and sore throat  Eyes: Negative for redness  Respiratory: Negative for cough and shortness of breath  Cardiovascular: Negative for chest pain  Gastrointestinal: Positive for abdominal pain  Negative for diarrhea, nausea and vomiting  Genitourinary: Positive for hematuria and urgency  Negative for dysuria, flank pain and frequency  Musculoskeletal: Negative for neck stiffness  Skin: Negative for rash  Allergic/Immunologic: Negative for food allergies  Neurological: Negative for weakness and numbness  Physical Exam  Physical Exam  Vitals signs and nursing note reviewed     Constitutional: General: He is not in acute distress  Appearance: He is not toxic-appearing  HENT:      Head: Normocephalic and atraumatic  Eyes:      Conjunctiva/sclera: Conjunctivae normal    Neck:      Musculoskeletal: Normal range of motion and neck supple  Trachea: No tracheal deviation  Cardiovascular:      Rate and Rhythm: Normal rate and regular rhythm  Heart sounds: Normal heart sounds, S1 normal and S2 normal  No murmur  No friction rub  No gallop  Pulmonary:      Effort: Pulmonary effort is normal  No respiratory distress  Breath sounds: Normal breath sounds  No decreased breath sounds, wheezing, rhonchi or rales  Chest:      Chest wall: No tenderness  Abdominal:      General: Bowel sounds are normal  There is no distension  Palpations: Abdomen is soft  Tenderness: There is abdominal tenderness in the suprapubic area  There is no right CVA tenderness, left CVA tenderness, guarding or rebound  Genitourinary:     Pubic Area: No rash  Penis: Normal  No erythema, tenderness or swelling  Scrotum/Testes: Normal    Skin:     General: Skin is warm and dry  Findings: No rash  Neurological:      Mental Status: He is alert  GCS: GCS eye subscore is 4  GCS verbal subscore is 5  GCS motor subscore is 6           Vital Signs  ED Triage Vitals   Temperature Pulse Respirations Blood Pressure SpO2   03/23/21 0549 03/23/21 0549 03/23/21 0549 03/23/21 0549 03/23/21 0549   97 6 °F (36 4 °C) 86 16 135/96 98 %      Temp Source Heart Rate Source Patient Position - Orthostatic VS BP Location FiO2 (%)   03/23/21 0549 03/23/21 0925 03/23/21 0549 03/23/21 0549 --   Oral Monitor Sitting Right arm       Pain Score       03/23/21 0549       3           Vitals:    03/23/21 1128 03/23/21 1347 03/23/21 1428 03/23/21 1511   BP: 142/84 147/71 136/64 154/93   Pulse: 66  83 70   Patient Position - Orthostatic VS: Lying   Lying         Visual Acuity      ED Medications  Medications oxybutynin (DITROPAN) tablet 5 mg (5 mg Oral Given 3/23/21 1649)   tamsulosin (FLOMAX) capsule 0 4 mg (0 4 mg Oral Given 3/23/21 1649)   atorvastatin (LIPITOR) tablet 10 mg (10 mg Oral Given 3/23/21 1649)   ciprofloxacin (CIPRO) tablet 500 mg (500 mg Oral Given 3/23/21 1347)   loratadine (CLARITIN) tablet 5 mg (5 mg Oral Given 3/23/21 1347)   finasteride (PROSCAR) tablet 5 mg (5 mg Oral Given 3/23/21 1347)   lisinopril (ZESTRIL) tablet 10 mg (10 mg Oral Given 3/23/21 1347)   melatonin tablet 1 5 mg (has no administration in time range)   fluticasone (FLONASE) 50 mcg/act nasal spray 1 spray (1 spray Each Nare Not Given 3/23/21 1548)   rOPINIRole (REQUIP) tablet 0 75 mg (has no administration in time range)   sodium chloride 0 9 % infusion (75 mL/hr Intravenous New Bag 3/23/21 1224)   iohexol (OMNIPAQUE) 350 MG/ML injection (MULTI-DOSE) 100 mL (100 mL Intravenous Given 3/23/21 0853)       Diagnostic Studies  Results Reviewed     Procedure Component Value Units Date/Time    Urine Microscopic [[id number]]  (Abnormal) Collected: 03/23/21 0748    Lab Status: Final result Specimen: Urine, Indwelling Chambers Catheter Updated: 03/23/21 0919     RBC, UA Innumerable /hpf      WBC, UA       Field obscured, unable to enumerate     /hpf     Epithelial Cells None Seen /hpf      Bacteria, UA None Seen /hpf     Urine culture [[id number]] Collected: 03/23/21 0748    Lab Status:  In process Specimen: Urine, Indwelling Chambers Catheter Updated: 03/23/21 0919    UA w Reflex to Microscopic w Reflex to Culture [[id number]]  (Abnormal) Collected: 03/23/21 0748    Lab Status: Final result Specimen: Urine, Indwelling Chambers Catheter Updated: 03/23/21 0919     Color, UA Bloody     Clarity, UA Cloudy     Specific Tabor, UA 1 020     pH, UA 7 0     Leukocytes, UA Small     Nitrite, UA Interference- unable to analyze     Protein, UA >=300 mg/dl      Glucose,  (1/10%) mg/dl      Ketones, UA Trace mg/dl      Urobilinogen, UA 1 0 E U /dl Bilirubin, UA Negative     Blood, UA Large    Basic metabolic panel [[id number]] Collected: 03/23/21 0739    Lab Status: Final result Specimen: Blood from Arm, Left Updated: 03/23/21 0802     Sodium 140 mmol/L      Potassium 4 0 mmol/L      Chloride 104 mmol/L      CO2 26 mmol/L      ANION GAP 10 mmol/L      BUN 13 mg/dL      Creatinine 1 18 mg/dL      Glucose 124 mg/dL      Calcium 8 9 mg/dL      eGFR 66 ml/min/1 73sq m     Narrative:      National Kidney Disease Foundation guidelines for Chronic Kidney Disease (CKD):     Stage 1 with normal or high GFR (GFR > 90 mL/min/1 73 square meters)    Stage 2 Mild CKD (GFR = 60-89 mL/min/1 73 square meters)    Stage 3A Moderate CKD (GFR = 45-59 mL/min/1 73 square meters)    Stage 3B Moderate CKD (GFR = 30-44 mL/min/1 73 square meters)    Stage 4 Severe CKD (GFR = 15-29 mL/min/1 73 square meters)    Stage 5 End Stage CKD (GFR <15 mL/min/1 73 square meters)  Note: GFR calculation is accurate only with a steady state creatinine    CBC and differential [[id number]]  (Abnormal) Collected: 03/23/21 0739    Lab Status: Final result Specimen: Blood from Arm, Left Updated: 03/23/21 0749     WBC 9 05 Thousand/uL      RBC 4 97 Million/uL      Hemoglobin 16 0 g/dL      Hematocrit 46 8 %      MCV 94 fL      MCH 32 2 pg      MCHC 34 2 g/dL      RDW 12 2 %      MPV 10 7 fL      Platelets 565 Thousands/uL      nRBC 0 /100 WBCs      Neutrophils Relative 77 %      Immat GRANS % 1 %      Lymphocytes Relative 12 %      Monocytes Relative 7 %      Eosinophils Relative 2 %      Basophils Relative 1 %      Neutrophils Absolute 7 12 Thousands/µL      Immature Grans Absolute 0 05 Thousand/uL      Lymphocytes Absolute 1 05 Thousands/µL      Monocytes Absolute 0 61 Thousand/µL      Eosinophils Absolute 0 16 Thousand/µL      Basophils Absolute 0 06 Thousands/µL                  CT renal protocol   Final Result by Rachel Flynn MD (03/23 0753)      1   Chambers catheter noted within the urinary bladder with a large intraluminal filling defect most consistent with large blood clot  (Approximately 7 4 x 8 3 x 5 8 cm)   2  Patient status post Urolift procedure  The 2 left-sided linear radiopaque densities project slightly beyond the anterior prostate margin toward the posterior wall of the urinary bladder however do not project into the bladder lumen   3  No hydronephrosis  Bilateral renal cysts  4  Colonic diverticulosis         Workstation performed: [id number]                    Procedures  Procedures         ED Course  ED Course as of Mar 23 1915   Tue Mar 23, 2021   6534 [facility] text sent to Urology  [address] [place] [address] with Equilla Double from Urology  Will place a dickey and obtain labs with a CT renal protocol  She will come see patient  0900 Spoke with Hever Holloway again  She recommends starting CBI       2026 Spoke with Dr Gordon Vega  His pager number is [phone]  He is aware of patient's work up this far  We can call him with results  1013 Dr Gordon Vega here to see patient  [address] [facility] Dr Gordon Vega would like patient admitted  [place] text sent to [facility]       25 [facility] Spoke with Dr Tiffanie Warren from [facility]  Will admit for observation and CBI                                SBIRT 20yo+      Most Recent Value   SBIRT (22 yo +)   In order to provide better care to our patients, we are screening all of our patients for alcohol and drug use  Would it be okay to ask you these screening questions? No Filed at: 03/23/2021 0605                    MDM  Number of Diagnoses or Management Options  Hematuria: new and requires workup  Urinary retention: new and requires workup  Diagnosis management comments: DDX consists of but not limited to: clot formation, UTI, postop complication    Will tiger text Urology  [address] [place] [address] text sent to Urology  [address] [place] [address] with Equilla Double from Urology  Will place a dickey and obtain labs with a CT renal protocol  She will come see patient  0900 Spoke with Hever Holloway again  She recommends starting CBI      4834 Spoke with Dr Yeni Conway  His pager number is [phone]  He is aware of patient's work up this far  We can call him with results  1013 Dr Yeni Conway here to see patient  200    Dr Yeni Conway at bedside trying to manually irrigate dickey  [address] [facility] Dr Yeni Conway would like patient admitted for CBI  [place] text sent to [facility]      [address] Spoke with Dr Iftikhar Almanza from [facility]  Will admit for observation and CBI  Patient stable at time of transfer to [facility] care             Amount and/or Complexity of Data Reviewed  Clinical lab tests: ordered and reviewed  Tests in the radiology section of CPT®: ordered and reviewed  Review and summarize past medical records: yes  Discuss the patient with other providers: yes    Patient Progress  Patient progress: stable      Disposition  Final diagnoses:   Urinary retention   Hematuria     Time reflects when diagnosis was documented in both MDM as applicable and the Disposition within this note     Time User Action Codes Description Comment    3/23/2021 11:21 AM [place] Moulds A Add [R33 9] Urinary retention     3/23/2021 11:21 AM [place] Moulds A Add [R31 9] Hematuria       ED Disposition     ED Disposition Condition Date/Time Comment    Admit Stable Tue Mar 23, 2021 11:21 AM Case was discussed with Dr Iftikhar Almanza from [facility] and the patient's admission status was agreed to be Admission Status: observation status to the service of Dr Iftikhar Almanza          Follow-up Information    None         Current Discharge Medication List      CONTINUE these medications which have NOT CHANGED    Details   alfuzosin (UROXATRAL) 10 mg 24 hr tablet Take 10 mg by mouth daily      atorvastatin (LIPITOR) 10 mg tablet Take 10 mg by mouth daily      betamethasone dipropionate (DIPROSONE) 0 05 % cream APPLY EVERY DAY TO FOOT  Refills: 5      Cholecalciferol (VITAMIN D PO) Take 1 tablet by mouth      ciprofloxacin (CIPRO) 500 mg tablet Take 1 tablet (500 mg total) by mouth every 12 (twelve) hours for 7 days  Qty: 14 tablet, Refills: 0    Associated Diagnoses: Benign prostatic hyperplasia with weak urinary stream      clobetasol (TEMOVATE) 0 05 % external solution Apply topically      clotrimazole-betamethasone (LOTRISONE) 1-0 05 % cream APPLY TWICE A DAY  Refills: 5      Coenzyme Q10 (COQ10 PO) Take by mouth      EPINEPHrine (EPIPEN) 0 3 mg/0 3 mL SOAJ Inject as directed      fexofenadine (ALLEGRA) 30 MG/5ML suspension Take 30 mg by mouth      finasteride (PROSCAR) 5 mg tablet Take 5 mg by mouth daily      halobetasol (ULTRAVATE) 0 05 % cream APPLY TWICE A DAY  Refills: 5      lisinopril (ZESTRIL) 10 mg tablet Take 10 mg by mouth daily      melatonin 1 mg Take 0 5 mg by mouth        mometasone (ELOCON) 0 1 % lotion One drop affected ear canal daily at bedtime when necessary itching  Qty: 60 mL, Refills: 0    Associated Diagnoses: Other infective chronic otitis externa of both ears      mometasone (NASONEX) 50 mcg/act nasal spray 1 spray into each nostril 2 (two) times a day  Qty: 3 Act, Refills: 3    Associated Diagnoses: Seasonal allergic rhinitis due to pollen      Multiple Vitamins-Minerals (OCUVITE ADULT 50+ PO) Take by mouth      NON FORMULARY       Olopatadine HCl 0 6 % SOLN 1-2 sprays each nostril twice a day when necessary nasal congestion  Qty: 1 Bottle, Refills: 5    Associated Diagnoses: Seasonal allergic rhinitis due to pollen      potassium bicarbonate (K-LYTE) 25 MEQ disintegrating tablet Take 25 mEq by mouth      rOPINIRole (REQUIP) 0 25 mg tablet TAKE 1 TABLET BY MOUTH 4 TIMES A DAY  Qty: 360 tablet, Refills: 3    Associated Diagnoses: Restless leg syndrome      tamsulosin (FLOMAX) 0 4 mg Take 0 4 mg by mouth daily with dinner      triamcinolone (KENALOG) 0 1 % cream APPLY TWICE A DAY  Refills: 5      Tuberculin-Allergy Syringes (B-D ALLERGY SYRINGE 1CC/28G) 28G X 1/2" 1 ML MISC 3 syringes Parra every 3 weeks  Qty: 50 each, Refills: 1    Associated Diagnoses: Seasonal allergic rhinitis due to pollen           No discharge procedures on file      PDMP Review     None          ED Provider  Electronically Signed by           Cece Varela PA-C  03/23/21 8903

## 2021-03-24 ENCOUNTER — ANESTHESIA EVENT (INPATIENT)
Dept: PERIOP | Facility: HOSPITAL | Age: 61
DRG: 717 | End: 2021-03-24
Payer: COMMERCIAL

## 2021-03-24 LAB
ANION GAP SERPL CALCULATED.3IONS-SCNC: 10 MMOL/L (ref 4–13)
BACTERIA UR CULT: NORMAL
BASOPHILS # BLD AUTO: 0.08 THOUSANDS/ΜL (ref 0–0.1)
BASOPHILS NFR BLD AUTO: 1 % (ref 0–1)
BUN SERPL-MCNC: 12 MG/DL (ref 5–25)
CALCIUM SERPL-MCNC: 9.1 MG/DL (ref 8.3–10.1)
CHLORIDE SERPL-SCNC: 105 MMOL/L (ref 100–108)
CO2 SERPL-SCNC: 24 MMOL/L (ref 21–32)
CREAT SERPL-MCNC: 1.18 MG/DL (ref 0.6–1.3)
EOSINOPHIL # BLD AUTO: 0.36 THOUSAND/ΜL (ref 0–0.61)
EOSINOPHIL NFR BLD AUTO: 4 % (ref 0–6)
ERYTHROCYTE [DISTWIDTH] IN BLOOD BY AUTOMATED COUNT: 12.4 % (ref 11.6–15.1)
GFR SERPL CREATININE-BSD FRML MDRD: 66 ML/MIN/1.73SQ M
GLUCOSE P FAST SERPL-MCNC: 123 MG/DL (ref 65–99)
GLUCOSE SERPL-MCNC: 123 MG/DL (ref 65–140)
HCT VFR BLD AUTO: 46 % (ref 36.5–49.3)
HGB BLD-MCNC: 15.6 G/DL (ref 12–17)
IMM GRANULOCYTES # BLD AUTO: 0.05 THOUSAND/UL (ref 0–0.2)
IMM GRANULOCYTES NFR BLD AUTO: 1 % (ref 0–2)
LYMPHOCYTES # BLD AUTO: 1.54 THOUSANDS/ΜL (ref 0.6–4.47)
LYMPHOCYTES NFR BLD AUTO: 15 % (ref 14–44)
MAGNESIUM SERPL-MCNC: 2.3 MG/DL (ref 1.6–2.6)
MCH RBC QN AUTO: 32.3 PG (ref 26.8–34.3)
MCHC RBC AUTO-ENTMCNC: 33.9 G/DL (ref 31.4–37.4)
MCV RBC AUTO: 95 FL (ref 82–98)
MONOCYTES # BLD AUTO: 0.81 THOUSAND/ΜL (ref 0.17–1.22)
MONOCYTES NFR BLD AUTO: 8 % (ref 4–12)
NEUTROPHILS # BLD AUTO: 7.43 THOUSANDS/ΜL (ref 1.85–7.62)
NEUTS SEG NFR BLD AUTO: 71 % (ref 43–75)
NRBC BLD AUTO-RTO: 0 /100 WBCS
PLATELET # BLD AUTO: 161 THOUSANDS/UL (ref 149–390)
PMV BLD AUTO: 10.5 FL (ref 8.9–12.7)
POTASSIUM SERPL-SCNC: 4 MMOL/L (ref 3.5–5.3)
RBC # BLD AUTO: 4.83 MILLION/UL (ref 3.88–5.62)
SODIUM SERPL-SCNC: 139 MMOL/L (ref 136–145)
WBC # BLD AUTO: 10.27 THOUSAND/UL (ref 4.31–10.16)

## 2021-03-24 PROCEDURE — 99232 SBSQ HOSP IP/OBS MODERATE 35: CPT | Performed by: STUDENT IN AN ORGANIZED HEALTH CARE EDUCATION/TRAINING PROGRAM

## 2021-03-24 PROCEDURE — 80048 BASIC METABOLIC PNL TOTAL CA: CPT | Performed by: STUDENT IN AN ORGANIZED HEALTH CARE EDUCATION/TRAINING PROGRAM

## 2021-03-24 PROCEDURE — 85025 COMPLETE CBC W/AUTO DIFF WBC: CPT | Performed by: STUDENT IN AN ORGANIZED HEALTH CARE EDUCATION/TRAINING PROGRAM

## 2021-03-24 PROCEDURE — 83735 ASSAY OF MAGNESIUM: CPT | Performed by: STUDENT IN AN ORGANIZED HEALTH CARE EDUCATION/TRAINING PROGRAM

## 2021-03-24 RX ORDER — CIPROFLOXACIN 2 MG/ML
400 INJECTION, SOLUTION INTRAVENOUS
Status: COMPLETED | OUTPATIENT
Start: 2021-03-25 | End: 2021-03-25

## 2021-03-24 RX ADMIN — CIPROFLOXACIN HYDROCHLORIDE 500 MG: 500 TABLET, FILM COATED ORAL at 22:33

## 2021-03-24 RX ADMIN — LORATADINE 5 MG: 10 TABLET ORAL at 08:42

## 2021-03-24 RX ADMIN — MAGNESIUM HYDROXIDE 30 ML: 400 SUSPENSION ORAL at 02:55

## 2021-03-24 RX ADMIN — MELATONIN 1.5 MG: at 22:34

## 2021-03-24 RX ADMIN — OXYBUTYNIN CHLORIDE 5 MG: 5 TABLET ORAL at 08:42

## 2021-03-24 RX ADMIN — LISINOPRIL 10 MG: 10 TABLET ORAL at 08:42

## 2021-03-24 RX ADMIN — ROPINIROLE 0.75 MG: 0.25 TABLET, FILM COATED ORAL at 22:34

## 2021-03-24 RX ADMIN — FLUTICASONE PROPIONATE 1 SPRAY: 50 SPRAY, METERED NASAL at 22:39

## 2021-03-24 RX ADMIN — FINASTERIDE 5 MG: 5 TABLET, FILM COATED ORAL at 08:42

## 2021-03-24 RX ADMIN — TAMSULOSIN HYDROCHLORIDE 0.4 MG: 0.4 CAPSULE ORAL at 16:32

## 2021-03-24 RX ADMIN — OXYBUTYNIN CHLORIDE 5 MG: 5 TABLET ORAL at 16:32

## 2021-03-24 RX ADMIN — SODIUM CHLORIDE 75 ML/HR: 0.9 INJECTION, SOLUTION INTRAVENOUS at 04:14

## 2021-03-24 RX ADMIN — CIPROFLOXACIN HYDROCHLORIDE 500 MG: 500 TABLET, FILM COATED ORAL at 08:42

## 2021-03-24 RX ADMIN — ATORVASTATIN CALCIUM 10 MG: 10 TABLET, FILM COATED ORAL at 16:32

## 2021-03-24 RX ADMIN — OXYBUTYNIN CHLORIDE 5 MG: 5 TABLET ORAL at 22:34

## 2021-03-24 NOTE — PROGRESS NOTES
[facility]  Progress Note Zack Cooper 1960, 56 y o  male MRN: [id number]  Unit/Bed#: E5 -01 Encounter: [id number]  Primary Care Provider: Naty Tejeda DO   Date and time admitted to hospital: 3/23/2021  5:45 AM    * Hematuria  Assessment & Plan  56year old male with BPH / bladder outlet obstructive symproms refractory to maximum dose of alpha blocker therapy who recently underwent cystoscopy with insertion urolift implants presented due to hematuria  - For OR jamia due to persistent hematuria  - Management per urology    HTN (hypertension)  Assessment & Plan  Continue lisinopril    BPH (benign prostatic hyperplasia)  Assessment & Plan  Continue flomax, and ciprofloxacin for recent intervention  Currently on PO last dose today  Restless leg syndrome  Assessment & Plan  Continue requip      VTE Pharmacologic Prophylaxis:   Pharmacologic: Pharmacologic VTE Prophylaxis contraindicated due to hematuria  Mechanical VTE Prophylaxis in Place: Yes    Patient Centered Rounds: I have performed bedside rounds with nursing staff today  Discussions with Specialists or Other Care Team Provider: nursing    Education and Discussions with Family / Patient: patient    Time Spent for Care: 30 minutes  More than 50% of total time spent on counseling and coordination of care as described above  Current Length of Stay: 0 day(s)    Current Patient Status: Inpatient   Certification Statement: The patient will continue to require additional inpatient hospital stay due to or jamia, persistent hematuria    Discharge Plan: active    Code Status: Level 1 - Full Code      Subjective:   Patient seen and examined at bedside  He continues to have blood clots / hematuria  Initially, there was a plan to discontinue his dickey, however due to persistence of blood clots plan he is planned for a surgical intervention jamia      Objective:     Vitals:   Temp (24hrs), Av 3 °F (36 3 °C), Min:96 9 °F (36 1 °C), Max:97 9 °F (36 6 °C)    Temp:  [96 9 °F (36 1 °C)-97 9 °F (36 6 °C)] 97 9 °F (36 6 °C)  HR:  [76-82] 82  Resp:  [16-18] 18  BP: (126-143)/(69-85) 143/84  SpO2:  [93 %-95 %] 95 %  There is no height or weight on file to calculate BMI  Input and Output Summary (last 24 hours): Intake/Output Summary (Last 24 hours) at 3/24/2021 1752  Last data filed at 3/24/2021 1454  Gross per 24 hour   Intake 1000 ml   Output 22068 ml   Net -52041 ml       Physical Exam:     Physical Exam  Vitals signs reviewed  HENT:      Head: Normocephalic  Nose: Nose normal       Mouth/Throat:      Mouth: Mucous membranes are moist    Eyes:      General: No scleral icterus  Extraocular Movements: Extraocular movements intact  Cardiovascular:      Rate and Rhythm: Normal rate and regular rhythm  Pulmonary:      Effort: Pulmonary effort is normal  No respiratory distress  Breath sounds: No wheezing or rales  Abdominal:      General: There is no distension  Palpations: Abdomen is soft  Tenderness: There is no abdominal tenderness  Genitourinary:     Comments: Chambers catheter in place with hematuria  Musculoskeletal: Normal range of motion  Skin:     General: Skin is warm  Neurological:      Mental Status: He is alert and oriented to person, place, and time  Mental status is at baseline     Psychiatric:         Mood and Affect: Mood normal          Behavior: Behavior normal        Additional Data:     Labs:    Results from last 7 days   Lab Units 03/24/21  0507   WBC Thousand/uL 10 27*   HEMOGLOBIN g/dL 15 6   HEMATOCRIT % 46 0   PLATELETS Thousands/uL 161   NEUTROS PCT % 71   LYMPHS PCT % 15   MONOS PCT % 8   EOS PCT % 4     Results from last 7 days   Lab Units 03/24/21  0507   SODIUM mmol/L 139   POTASSIUM mmol/L 4 0   CHLORIDE mmol/L 105   CO2 mmol/L 24   BUN mg/dL 12   CREATININE mg/dL 1 18   ANION GAP mmol/L 10   CALCIUM mg/dL 9 1   GLUCOSE RANDOM mg/dL 123                           * I Have Reviewed All Lab Data Listed Above  * Additional Pertinent Lab Tests Reviewed: Lynnette 66 Admission Reviewed    Imaging:    Imaging Reports Reviewed Today Include: No new imaging    Recent Cultures (last 7 days):     Results from last 7 days   Lab Units 03/23/21  0748   URINE CULTURE  No Growth <1000 cfu/mL       Last 24 Hours Medication List:   Current Facility-Administered Medications   Medication Dose Route Frequency Provider Last Rate    atorvastatin  10 mg Oral Daily With Dinner Robert Ford MD      ciprofloxacin  500 mg Oral Q12H [facility] & assisted Robert Ford MD      finasteride  5 mg Oral Daily Robert Ford MD      fluticasone  1 spray Each Nare Daily Robert Ford MD      lisinopril  10 mg Oral Daily Robert Ford MD      loratadine  5 mg Oral Daily Robert Ford MD      magnesium hydroxide  30 mL Oral Daily PRN Jessica Sensing, DO      melatonin  1 5 mg Oral HS Robert Ford MD      oxybutynin  5 mg Oral TID Robert Ford MD      rOPINIRole  0 75 mg Oral HS Robert Ford MD      sodium chloride  75 mL/hr Intravenous Continuous Robert Ford MD 75 mL/hr (03/24/21 0414)    tamsulosin  0 4 mg Oral Daily With Gerry Valdez MD          Today, Patient Was Seen By: Eden Garza MD    ** Please Note: Dictation voice to text software may have been used in the creation of this document   **

## 2021-03-24 NOTE — PLAN OF CARE
Problem: PAIN - ADULT  Goal: Verbalizes/displays adequate comfort level or baseline comfort level  Description: Interventions:  - Encourage patient to monitor pain and request assistance  - Assess pain using appropriate pain scale  - Administer analgesics based on type and severity of pain and evaluate response  - Implement non-pharmacological measures as appropriate and evaluate response  - Consider cultural and social influences on pain and pain management  - Notify physician/advanced practitioner if interventions unsuccessful or patient reports new pain  Outcome: Progressing     Problem: PAIN - ADULT  Goal: Verbalizes/displays adequate comfort level or baseline comfort level  Description: Interventions:  - Encourage patient to monitor pain and request assistance  - Assess pain using appropriate pain scale  - Administer analgesics based on type and severity of pain and evaluate response  - Implement non-pharmacological measures as appropriate and evaluate response  - Consider cultural and social influences on pain and pain management  - Notify physician/advanced practitioner if interventions unsuccessful or patient reports new pain  Outcome: Progressing     Problem: INFECTION - ADULT  Goal: Absence or prevention of progression during hospitalization  Description: INTERVENTIONS:  - Assess and monitor for signs and symptoms of infection  - Monitor lab/diagnostic results  - Monitor all insertion sites, i e  indwelling lines, tubes, and drains  - Monitor endotracheal if appropriate and nasal secretions for changes in amount and color  - [place] appropriate cooling/warming therapies per order  - Administer medications as ordered  - Instruct and encourage patient and family to use good hand hygiene technique  - Identify and instruct in appropriate isolation precautions for identified infection/condition  Outcome: Progressing  Goal: Absence of fever/infection during neutropenic period  Description: INTERVENTIONS:  - Monitor WBC    Outcome: Progressing     Problem: SAFETY ADULT  Goal: Patient will remain free of falls  Description: INTERVENTIONS:  - Assess patient frequently for physical needs  -  Identify cognitive and physical deficits and behaviors that affect risk of falls    -  [place] fall precautions as indicated by assessment   - Educate patient/family on patient safety including physical limitations  - Instruct patient to call for assistance with activity based on assessment  - Modify environment to reduce risk of injury  - Consider OT/PT consult to assist with strengthening/mobility  Outcome: Progressing  Goal: Maintain or return to baseline ADL function  Description: INTERVENTIONS:  -  Assess patient's ability to carry out ADLs; assess patient's baseline for ADL function and identify physical deficits which impact ability to perform ADLs (bathing, care of mouth/teeth, toileting, grooming, dressing, etc )  - Assess/evaluate cause of self-care deficits   - Assess range of motion  - Assess patient's mobility; develop plan if impaired  - Assess patient's need for assistive devices and provide as appropriate  - Encourage maximum independence but intervene and supervise when necessary  - Involve family in performance of ADLs  - Assess for home care needs following discharge   - Consider OT consult to assist with ADL evaluation and planning for discharge  - Provide patient education as appropriate  Outcome: Progressing  Goal: Maintain or return mobility status to optimal level  Description: INTERVENTIONS:  - Assess patient's baseline mobility status (ambulation, transfers, stairs, etc )    - Identify cognitive and physical deficits and behaviors that affect mobility  - Identify mobility aids required to assist with transfers and/or ambulation (gait belt, sit-to-stand, lift, walker, cane, etc )  - [place] fall precautions as indicated by assessment  - Record patient progress and toleration of activity level on Mobility SBAR; progress patient to next Phase/Stage  - Instruct patient to call for assistance with activity based on assessment  - Consider rehabilitation consult to assist with strengthening/weightbearing, etc   Outcome: Progressing     Problem: DISCHARGE PLANNING  Goal: Discharge to home or other facility with appropriate resources  Description: INTERVENTIONS:  - Identify barriers to discharge w/patient and caregiver  - Arrange for needed discharge resources and transportation as appropriate  - Identify discharge learning needs (meds, wound care, etc )  - Arrange for interpretive services to assist at discharge as needed  - Refer to Case Management Department for coordinating discharge planning if the patient needs post-hospital services based on physician/advanced practitioner order or complex needs related to functional status, cognitive ability, or social support system  Outcome: Progressing     Problem: Knowledge Deficit  Goal: Patient/family/caregiver demonstrates understanding of disease process, treatment plan, medications, and discharge instructions  Description: Complete learning assessment and assess knowledge base  Interventions:  - Provide teaching at level of understanding  - Provide teaching via preferred learning methods  Outcome: Progressing     Problem: Potential for Falls  Goal: Patient will remain free of falls  Description: INTERVENTIONS:  - Assess patient frequently for physical needs  -  Identify cognitive and physical deficits and behaviors that affect risk of falls    -  [place] fall precautions as indicated by assessment   - Educate patient/family on patient safety including physical limitations  - Instruct patient to call for assistance with activity based on assessment  - Modify environment to reduce risk of injury  - Consider OT/PT consult to assist with strengthening/mobility  Outcome: Progressing

## 2021-03-24 NOTE — UTILIZATION REVIEW
2+ radial Initial Clinical Review      Admission Orders (From admission, onward)     Ordered        03/24/21 1158  Inpatient Admission  Once         03/23/21 1115  Place in Observation  Once                   3/24  CHANGED to INPT status due to  Ongoing hematuria requiring continuation of  CBI  and cysto/fulgeration (scheduled for  3/25)       Inpatient Admission Once     Question Answer   Level of Care Med Surg   Estimated length of stay More than 2 Midnights   Certification I certify that inpatient services are medically necessary for this patient for a duration of greater than two midnights  See H&P and MD Progress Notes for additional information about the patient's course of treatment  ED Arrival Information     Expected Arrival Acuity Means of Arrival Escorted By Service Admission Type    - 3/23/2021 05:42 Urgent Walk-In Family Member Hospitalist Urgent    Arrival Complaint    penis bleed        Chief Complaint   Patient presents with    Urinary Retention     Pt arrives from home reporting he had his catheter removed yesterday at 1430 post urolyft  Pt was able to urinate when he arrived home and passing clots  Pt reports at 0130 this morning he was woken from his sleep with severe pressure and urge to urinate leadin g to the expell of multiple large clots  At 230 pt states he urinated "smostly clear with small clots" and since has not been able to urinate  Pt reports pressure and bladder spasms at this time  Assessment/Plan:    57 yo male,  To ER from home,  Admitted OBS status, ms level of care for treatment of  Urinary retention / gross  hematuria  Had Urolift procedure on 3/18,  Chambers removed 3/22  Urology consulted in ER - manual irrigation for approx 45 min w/copious amounts of clots recovered  Initiated CBI, flomax and  Cipro  3/23  UROLOGY:  mild constipation/bearing down for BM might be aggravating bleeding  Cont cipro and flomax      Cont CBI  And give laxativw    3/23  @  2115 - required 2nd manual irrigation w/further clots recovered    3/24   UROLOGY    Constipation resolved, fewer bladder spasms   hgb stable  Manual irrigation this am yielded few clots (dark - old blood)   CBI resumed  Will try to wean off CBI today  3/24  Unable to dc CBI due to ongoing bleeding -  CBI Continues  Scheduled for  CYSTO w/evacuation of clots;  fulgeration  Tomorrow       ED Triage Vitals   Temperature Pulse Respirations Blood Pressure SpO2   03/23/21 0549 03/23/21 0549 03/23/21 0549 03/23/21 0549 03/23/21 0549   97 6 °F (36 4 °C) 86 16 135/96 98 %      Temp Source Heart Rate Source Patient Position - Orthostatic VS BP Location FiO2 (%)   03/23/21 0549 03/23/21 0925 03/23/21 0549 03/23/21 0549 --   Oral Monitor Sitting Right arm       Pain Score       03/23/21 0549       3          Wt Readings from Last 1 Encounters:   03/18/21 109 kg (240 lb)     Additional Vital Signs:   03/24/21 0707  97 2 °F (36 2 °C)Abnormal   78  16  126/85  94 %  None (Room air)  Lying   03/23/21 2300  96 9 °F (36 1 °C)Abnormal   76  16  127/69  93 %  --  Lying   03/23/21 1511  97 9 °F (36 6 °C)  70  16  154/93  97 %  --  Lying   03/23/21 1428  --  83  16  136/64  97 %  None (Room air)  --   03/23/21 1347  --  --  --  147/71  --  --  --   03/23/21 1128  --  66  16  142/84  96 %  None (Room air)  Lying   03/23/21 0925  --  71  16  143/77  97 %  None (Room air)  Sitting     Pertinent Labs/Diagnostic Test Results:  Ekg/cxr  None     3/23   CTAP -    1  Chambers catheter noted within the urinary bladder with a large intraluminal filling defect most consistent with large blood clot   (Approximately 7 4 x 8 3 x 5 8 cm)   2  Patient status post Urolift procedure   The 2 left-sided linear radiopaque densities project slightly beyond the anterior prostate margin toward the posterior wall of the urinary bladder however do not project into the bladder lumen   3  No hydronephrosis   Bilateral renal cysts     4  Colonic diverticulosis Results from last 7 days   Lab Units 03/24/21  0507 03/23/21  0739   WBC Thousand/uL 10 27* 9 05   HEMOGLOBIN g/dL 15 6 16 0   HEMATOCRIT % 46 0 46 8   PLATELETS Thousands/uL 161 152   NEUTROS ABS Thousands/µL 7 43 7 12         Results from last 7 days   Lab Units 03/24/21  0507 03/23/21  0739   SODIUM mmol/L 139 140   POTASSIUM mmol/L 4 0 4 0   CHLORIDE mmol/L 105 104   CO2 mmol/L 24 26   ANION GAP mmol/L 10 10   BUN mg/dL 12 13   CREATININE mg/dL 1 18 1 18   EGFR ml/min/1 73sq m 66 66   CALCIUM mg/dL 9 1 8 9   MAGNESIUM mg/dL 2 3  --              Results from last 7 days   Lab Units 03/24/21  0507 03/23/21  0739   GLUCOSE RANDOM mg/dL 123 124         Results from last 7 days   Lab Units 03/23/21  0748   CLARITY UA  Cloudy   COLOR UA  Bloody   SPEC GRAV UA  1 020   PH UA  7 0   GLUCOSE UA mg/dl 100 (1/10%)*   KETONES UA mg/dl Trace*   BLOOD UA  Large*   PROTEIN UA mg/dl >=300*   NITRITE UA  Interference- unable to analyze   BILIRUBIN UA  Negative   UROBILINOGEN UA E U /dl 1 0   LEUKOCYTES UA  Small*   WBC UA /hpf Field obscured, unable to enumerate*   RBC UA /hpf Innumerable*   BACTERIA UA /hpf None Seen   EPITHELIAL CELLS WET PREP /hpf None Seen     Results from last 7 days   Lab Units 03/23/21  0748   URINE CULTURE  No Growth <1000 cfu/mL       ED Treatment:   Medication Administration from 03/23/2021 0542 to 03/23/2021 1502       Date/Time Order Dose Route Action     03/23/2021 1040 oxybutynin (DITROPAN) tablet 5 mg 5 mg Oral Given     03/23/2021 1347 ciprofloxacin (CIPRO) tablet 500 mg 500 mg Oral Given     03/23/2021 1347 loratadine (CLARITIN) tablet 5 mg 5 mg Oral Given     03/23/2021 1347 finasteride (PROSCAR) tablet 5 mg 5 mg Oral Given     03/23/2021 1347 lisinopril (ZESTRIL) tablet 10 mg 10 mg Oral Given     03/23/2021 1224 sodium chloride 0 9 % infusion 75 mL/hr Intravenous New Bag        Past Medical History:   Diagnosis Date    Eczema     Enlarged prostate     Environmental allergies     Hypertension     RLS (restless legs syndrome)      Present on Admission:   Restless leg syndrome   BPH (benign prostatic hyperplasia)   HTN (hypertension)      Admitting Diagnosis: Urinary retention [R33 9]  Hematuria [R31 9]  Age/Sex: 56 y o  male  Admission Orders:    CBI;  Consult urology    Scheduled Medications:  atorvastatin, 10 mg, Oral, Daily With Dinner  ciprofloxacin, 500 mg, Oral, Q12H SATNAM  finasteride, 5 mg, Oral, Daily  fluticasone, 1 spray, Each Nare, Daily  lisinopril, 10 mg, Oral, Daily  loratadine, 5 mg, Oral, Daily  melatonin, 1 5 mg, Oral, HS  oxybutynin, 5 mg, Oral, TID  rOPINIRole, 0 75 mg, Oral, HS  tamsulosin, 0 4 mg, Oral, Daily With Dinner      Continuous IV Infusions:  sodium chloride, 75 mL/hr, Intravenous, Continuous      PRN Meds:  magnesium hydroxide, 30 mL, Oral, Daily PRN  GIVEN  3/24  @  0300       Network Utilization Review Department  ATTENTION: Please call with any questions or concerns to [phone] and carefully listen to the prompts so that you are directed to the right person  All voicemails are confidential   [facility] all requests for admission clinical reviews, approved or denied determinations and any other requests to dedicated fax number below belonging to the campus where the patient is receiving treatment   List of dedicated fax numbers for the Facilities:  [address] DENIALS (Administrative/Medical Necessity) [phone]   [address] (Maternity/NICU/Pediatrics) [phone] [address] [place] [address] Dr Johann Grant 0972 (  [facility] "Sil[facility]" 103) [address] [place] [address] [phone]   Bi Jiménez [address] [phone]   [place] [address] [facility] [facility] [phone]

## 2021-03-24 NOTE — ASSESSMENT & PLAN NOTE
56year old male with BPH / bladder outlet obstructive symproms refractory to maximum dose of alpha blocker therapy who recently underwent cystoscopy with insertion urolift implants presented due to hematuria    - For OR jamia due to persistent hematuria  - Management per urology

## 2021-03-24 NOTE — PROGRESS NOTES
Urology  Patient is postoperative day 5 status post UroLift procedure  He presented to the emergency room earlier today with gross hematuria and urinary retention  Emergency Room staff placed a 20 [place] 3 way Chambers catheter  Patient was evaluated by myself in the emergency room  Under my care bladder irrigation was performed for approximately 45 minutes  Copious amounts of clots were recovered  Bladder irrigation was then resumed after clot removal was finished  Decision was made to admit the patient to observation status under the care of Renee  Internal Medicine  Physical exam  Patient is alert oriented to person place and time  He has a 20 [place] 3 way Chambers catheter in place  Catheter is draining blood-tinged urine at the present  Irrigation was done for the 2nd time at the bedside at approximately 9:15 p m    Clots were recovered  Irrigation was done until no further clots were recovered  Catheter was then resumed to continuous bladder irrigation  Diagnosis  Gross hematuria  BPH  Urinary clot retention  Plan  Laxatives to address mild constipation, it seems the bleeding is aggravated by bearing down to have a bowel movement  Continue ciprofloxacin  Continue alpha-blockers  Continue finasteride  Voiding trial or 3/24/2021  Outpatient urologic follow-up pending anticipated successful voiding trial

## 2021-03-24 NOTE — ANESTHESIA PREPROCEDURE EVALUATION
Procedure:  CYSTOSCOPY EVACUATION OF CLOTS; FULGERATION (N/A Bladder)    Relevant Problems   CARDIO   (+) HTN (hypertension)      /RENAL   (+) BPH (benign prostatic hyperplasia)        Physical Exam    Airway    Mallampati score: II  TM Distance: <3 FB  Neck ROM: full     Dental       Cardiovascular  Rhythm: regular, Rate: normal,     Pulmonary  Breath sounds clear to auscultation,     Other Findings        Anesthesia Plan  ASA Score- 2 Emergent    Anesthesia Type- general with ASA Monitors  Additional Monitors:   Airway Plan:           Plan Factors-Exercise tolerance (METS): >4 METS  Chart reviewed  Imaging results reviewed  Existing labs reviewed  Patient summary reviewed  Patient is not a current smoker  Patient not instructed to abstain from smoking on day of procedure  Patient did not smoke on day of surgery  Obstructive sleep apnea risk education given perioperatively  Induction- intravenous  Postoperative Plan-     Informed Consent- Anesthetic plan and risks discussed with patient

## 2021-03-24 NOTE — PROGRESS NOTES
Urology  Postoperative day 6, status post Urolift for symptomatic BPH  Patient voices no complaints  Constipation has resolved  He has fewer bladder spasms  Laboratory data reviewed  Patient's hemoglobin is stable  Fasting blood sugar is slightly elevated  On examination the abdomen is soft, there is no penoscrotal edema  Catheter is draining blood-tinged urine  Verbal consent was obtained for additional irrigation at the bedside  Irrigation yielded a few clots, clots appeared to be dark suggesting that the clots were represented old blood  Continuous bladder irrigation was resumed  Catheter drainage was clear  Diagnosis  Gross hematuria  BPH  Urinary retention  Plan  Wean off continuous bladder irrigation  DC Chambers catheter later today, if his drainage is clear with irrigation off  Informed consent was obtained for cystoscopy clot evacuation and fulguration of bleeding in the event that hematuria fails to resolve with conservative measures(continuous bladder irrigation, manual bladder irrigation)

## 2021-03-25 ENCOUNTER — ANESTHESIA (INPATIENT)
Dept: PERIOP | Facility: HOSPITAL | Age: 61
DRG: 717 | End: 2021-03-25
Payer: COMMERCIAL

## 2021-03-25 LAB
ANION GAP SERPL CALCULATED.3IONS-SCNC: 10 MMOL/L (ref 4–13)
BASOPHILS # BLD AUTO: 0.06 THOUSANDS/ΜL (ref 0–0.1)
BASOPHILS NFR BLD AUTO: 1 % (ref 0–1)
BUN SERPL-MCNC: 13 MG/DL (ref 5–25)
CALCIUM SERPL-MCNC: 8.3 MG/DL (ref 8.3–10.1)
CHLORIDE SERPL-SCNC: 107 MMOL/L (ref 100–108)
CO2 SERPL-SCNC: 24 MMOL/L (ref 21–32)
CREAT SERPL-MCNC: 1.14 MG/DL (ref 0.6–1.3)
EOSINOPHIL # BLD AUTO: 0.43 THOUSAND/ΜL (ref 0–0.61)
EOSINOPHIL NFR BLD AUTO: 5 % (ref 0–6)
ERYTHROCYTE [DISTWIDTH] IN BLOOD BY AUTOMATED COUNT: 12.5 % (ref 11.6–15.1)
GFR SERPL CREATININE-BSD FRML MDRD: 69 ML/MIN/1.73SQ M
GLUCOSE SERPL-MCNC: 102 MG/DL (ref 65–140)
GLUCOSE SERPL-MCNC: 106 MG/DL (ref 65–140)
HCT VFR BLD AUTO: 43 % (ref 36.5–49.3)
HGB BLD-MCNC: 14.2 G/DL (ref 12–17)
IMM GRANULOCYTES # BLD AUTO: 0.04 THOUSAND/UL (ref 0–0.2)
IMM GRANULOCYTES NFR BLD AUTO: 1 % (ref 0–2)
LYMPHOCYTES # BLD AUTO: 1.51 THOUSANDS/ΜL (ref 0.6–4.47)
LYMPHOCYTES NFR BLD AUTO: 19 % (ref 14–44)
MAGNESIUM SERPL-MCNC: 2.1 MG/DL (ref 1.6–2.6)
MCH RBC QN AUTO: 31.8 PG (ref 26.8–34.3)
MCHC RBC AUTO-ENTMCNC: 33 G/DL (ref 31.4–37.4)
MCV RBC AUTO: 96 FL (ref 82–98)
MONOCYTES # BLD AUTO: 0.63 THOUSAND/ΜL (ref 0.17–1.22)
MONOCYTES NFR BLD AUTO: 8 % (ref 4–12)
NEUTROPHILS # BLD AUTO: 5.38 THOUSANDS/ΜL (ref 1.85–7.62)
NEUTS SEG NFR BLD AUTO: 66 % (ref 43–75)
NRBC BLD AUTO-RTO: 0 /100 WBCS
PLATELET # BLD AUTO: 149 THOUSANDS/UL (ref 149–390)
PMV BLD AUTO: 10.6 FL (ref 8.9–12.7)
POTASSIUM SERPL-SCNC: 3.9 MMOL/L (ref 3.5–5.3)
RBC # BLD AUTO: 4.47 MILLION/UL (ref 3.88–5.62)
SODIUM SERPL-SCNC: 141 MMOL/L (ref 136–145)
WBC # BLD AUTO: 8.05 THOUSAND/UL (ref 4.31–10.16)

## 2021-03-25 PROCEDURE — 99232 SBSQ HOSP IP/OBS MODERATE 35: CPT | Performed by: STUDENT IN AN ORGANIZED HEALTH CARE EDUCATION/TRAINING PROGRAM

## 2021-03-25 PROCEDURE — 0TCB8ZZ EXTIRPATION OF MATTER FROM BLADDER, VIA NATURAL OR ARTIFICIAL OPENING ENDOSCOPIC: ICD-10-PCS | Performed by: UROLOGY

## 2021-03-25 PROCEDURE — 80048 BASIC METABOLIC PNL TOTAL CA: CPT | Performed by: STUDENT IN AN ORGANIZED HEALTH CARE EDUCATION/TRAINING PROGRAM

## 2021-03-25 PROCEDURE — 0T5B8ZZ DESTRUCTION OF BLADDER, VIA NATURAL OR ARTIFICIAL OPENING ENDOSCOPIC: ICD-10-PCS | Performed by: UROLOGY

## 2021-03-25 PROCEDURE — 85025 COMPLETE CBC W/AUTO DIFF WBC: CPT | Performed by: STUDENT IN AN ORGANIZED HEALTH CARE EDUCATION/TRAINING PROGRAM

## 2021-03-25 PROCEDURE — 83735 ASSAY OF MAGNESIUM: CPT | Performed by: STUDENT IN AN ORGANIZED HEALTH CARE EDUCATION/TRAINING PROGRAM

## 2021-03-25 PROCEDURE — 82948 REAGENT STRIP/BLOOD GLUCOSE: CPT

## 2021-03-25 RX ORDER — MAGNESIUM HYDROXIDE 1200 MG/15ML
LIQUID ORAL AS NEEDED
Status: DISCONTINUED | OUTPATIENT
Start: 2021-03-25 | End: 2021-03-25 | Stop reason: HOSPADM

## 2021-03-25 RX ORDER — CIPROFLOXACIN 500 MG/1
500 TABLET, FILM COATED ORAL EVERY 12 HOURS SCHEDULED
Status: COMPLETED | OUTPATIENT
Start: 2021-03-25 | End: 2021-03-25

## 2021-03-25 RX ORDER — CIPROFLOXACIN 500 MG/1
500 TABLET, FILM COATED ORAL EVERY 12 HOURS SCHEDULED
Status: DISCONTINUED | OUTPATIENT
Start: 2021-03-25 | End: 2021-03-25 | Stop reason: SDUPTHER

## 2021-03-25 RX ORDER — OXYCODONE HYDROCHLORIDE AND ACETAMINOPHEN 5; 325 MG/1; MG/1
1 TABLET ORAL EVERY 4 HOURS PRN
Status: DISCONTINUED | OUTPATIENT
Start: 2021-03-25 | End: 2021-03-26 | Stop reason: HOSPADM

## 2021-03-25 RX ORDER — ACETAMINOPHEN 325 MG/1
650 TABLET ORAL EVERY 6 HOURS PRN
Status: DISCONTINUED | OUTPATIENT
Start: 2021-03-25 | End: 2021-03-26 | Stop reason: HOSPADM

## 2021-03-25 RX ORDER — PROPOFOL 10 MG/ML
INJECTION, EMULSION INTRAVENOUS AS NEEDED
Status: DISCONTINUED | OUTPATIENT
Start: 2021-03-25 | End: 2021-03-25

## 2021-03-25 RX ORDER — MIDAZOLAM HYDROCHLORIDE 2 MG/2ML
INJECTION, SOLUTION INTRAMUSCULAR; INTRAVENOUS AS NEEDED
Status: DISCONTINUED | OUTPATIENT
Start: 2021-03-25 | End: 2021-03-25

## 2021-03-25 RX ORDER — DEXAMETHASONE SODIUM PHOSPHATE 4 MG/ML
INJECTION, SOLUTION INTRA-ARTICULAR; INTRALESIONAL; INTRAMUSCULAR; INTRAVENOUS; SOFT TISSUE AS NEEDED
Status: DISCONTINUED | OUTPATIENT
Start: 2021-03-25 | End: 2021-03-25

## 2021-03-25 RX ORDER — LIDOCAINE HYDROCHLORIDE 20 MG/ML
JELLY TOPICAL AS NEEDED
Status: DISCONTINUED | OUTPATIENT
Start: 2021-03-25 | End: 2021-03-25 | Stop reason: HOSPADM

## 2021-03-25 RX ORDER — ONDANSETRON 2 MG/ML
INJECTION INTRAMUSCULAR; INTRAVENOUS AS NEEDED
Status: DISCONTINUED | OUTPATIENT
Start: 2021-03-25 | End: 2021-03-25

## 2021-03-25 RX ORDER — ONDANSETRON 2 MG/ML
4 INJECTION INTRAMUSCULAR; INTRAVENOUS EVERY 6 HOURS PRN
Status: DISCONTINUED | OUTPATIENT
Start: 2021-03-25 | End: 2021-03-26 | Stop reason: HOSPADM

## 2021-03-25 RX ORDER — LIDOCAINE HYDROCHLORIDE 20 MG/ML
INJECTION, SOLUTION EPIDURAL; INFILTRATION; INTRACAUDAL; PERINEURAL AS NEEDED
Status: DISCONTINUED | OUTPATIENT
Start: 2021-03-25 | End: 2021-03-25

## 2021-03-25 RX ORDER — MAGNESIUM HYDROXIDE 1200 MG/15ML
3000 LIQUID ORAL CONTINUOUS
Status: DISCONTINUED | OUTPATIENT
Start: 2021-03-25 | End: 2021-03-26 | Stop reason: HOSPADM

## 2021-03-25 RX ORDER — FENTANYL CITRATE 50 UG/ML
INJECTION, SOLUTION INTRAMUSCULAR; INTRAVENOUS AS NEEDED
Status: DISCONTINUED | OUTPATIENT
Start: 2021-03-25 | End: 2021-03-25

## 2021-03-25 RX ORDER — SORBITOL 30 G/1000ML
IRRIGANT IRRIGATION AS NEEDED
Status: DISCONTINUED | OUTPATIENT
Start: 2021-03-25 | End: 2021-03-25 | Stop reason: HOSPADM

## 2021-03-25 RX ADMIN — OXYBUTYNIN CHLORIDE 5 MG: 5 TABLET ORAL at 08:04

## 2021-03-25 RX ADMIN — OXYBUTYNIN CHLORIDE 5 MG: 5 TABLET ORAL at 22:27

## 2021-03-25 RX ADMIN — SODIUM CHLORIDE: 0.9 INJECTION, SOLUTION INTRAVENOUS at 14:27

## 2021-03-25 RX ADMIN — CIPROFLOXACIN HYDROCHLORIDE 500 MG: 500 TABLET, FILM COATED ORAL at 22:27

## 2021-03-25 RX ADMIN — LIDOCAINE HYDROCHLORIDE 100 MG: 20 INJECTION, SOLUTION EPIDURAL; INFILTRATION; INTRACAUDAL; PERINEURAL at 13:36

## 2021-03-25 RX ADMIN — LORATADINE 5 MG: 10 TABLET ORAL at 08:01

## 2021-03-25 RX ADMIN — FENTANYL CITRATE 50 MCG: 50 INJECTION, SOLUTION INTRAMUSCULAR; INTRAVENOUS at 13:45

## 2021-03-25 RX ADMIN — LISINOPRIL 10 MG: 10 TABLET ORAL at 15:52

## 2021-03-25 RX ADMIN — SODIUM CHLORIDE FOR IRRIGATION 3000 ML: 0.9 SOLUTION IRRIGATION at 16:32

## 2021-03-25 RX ADMIN — MELATONIN 1.5 MG: at 22:27

## 2021-03-25 RX ADMIN — PROPOFOL 200 MG: 10 INJECTION, EMULSION INTRAVENOUS at 13:36

## 2021-03-25 RX ADMIN — ROPINIROLE 0.75 MG: 0.25 TABLET, FILM COATED ORAL at 22:28

## 2021-03-25 RX ADMIN — FENTANYL CITRATE 25 MCG: 50 INJECTION, SOLUTION INTRAMUSCULAR; INTRAVENOUS at 13:53

## 2021-03-25 RX ADMIN — ONDANSETRON 4 MG: 2 INJECTION INTRAMUSCULAR; INTRAVENOUS at 14:19

## 2021-03-25 RX ADMIN — MIDAZOLAM 2 MG: 1 INJECTION INTRAMUSCULAR; INTRAVENOUS at 13:28

## 2021-03-25 RX ADMIN — CIPROFLOXACIN: 2 INJECTION INTRAVENOUS at 13:25

## 2021-03-25 RX ADMIN — DEXAMETHASONE SODIUM PHOSPHATE 4 MG: 4 INJECTION INTRA-ARTICULAR; INTRALESIONAL; INTRAMUSCULAR; INTRAVENOUS; SOFT TISSUE at 13:43

## 2021-03-25 RX ADMIN — FINASTERIDE 5 MG: 5 TABLET, FILM COATED ORAL at 08:01

## 2021-03-25 RX ADMIN — SODIUM CHLORIDE 75 ML/HR: 0.9 INJECTION, SOLUTION INTRAVENOUS at 05:40

## 2021-03-25 RX ADMIN — FENTANYL CITRATE 25 MCG: 50 INJECTION, SOLUTION INTRAMUSCULAR; INTRAVENOUS at 13:56

## 2021-03-25 RX ADMIN — FLUTICASONE PROPIONATE 1 SPRAY: 50 SPRAY, METERED NASAL at 22:28

## 2021-03-25 NOTE — PROGRESS NOTES
Patient and wife state that they do not want to go home with a leg bag  They had too much trouble with the one previous  Would prefer to go home with larger drainage bag

## 2021-03-25 NOTE — PLAN OF CARE
Problem: PAIN - ADULT  Goal: Verbalizes/displays adequate comfort level or baseline comfort level  Description: Interventions:  - Encourage patient to monitor pain and request assistance  - Assess pain using appropriate pain scale  - Administer analgesics based on type and severity of pain and evaluate response  - Implement non-pharmacological measures as appropriate and evaluate response  - Consider cultural and social influences on pain and pain management  - Notify physician/advanced practitioner if interventions unsuccessful or patient reports new pain  3/25/2021 1132 by Anuj Burgos RN  Outcome: Progressing  3/25/2021 1131 by Anuj Burgos RN  Outcome: Progressing     Problem: INFECTION - ADULT  Goal: Absence or prevention of progression during hospitalization  Description: INTERVENTIONS:  - Assess and monitor for signs and symptoms of infection  - Monitor lab/diagnostic results  - Monitor all insertion sites, i e  indwelling lines, tubes, and drains  - Monitor endotracheal if appropriate and nasal secretions for changes in amount and color  - [place] appropriate cooling/warming therapies per order  - Administer medications as ordered  - Instruct and encourage patient and family to use good hand hygiene technique  - Identify and instruct in appropriate isolation precautions for identified infection/condition  3/25/2021 1132 by Anuj Burgos RN  Outcome: Progressing  3/25/2021 1131 by Anuj Burgos RN  Outcome: Progressing     Problem: SAFETY ADULT  Goal: Patient will remain free of falls  Description: INTERVENTIONS:  - Assess patient frequently for physical needs  -  Identify cognitive and physical deficits and behaviors that affect risk of falls    -  [place] fall precautions as indicated by assessment   - Educate patient/family on patient safety including physical limitations  - Instruct patient to call for assistance with activity based on assessment  - Modify environment to reduce risk of injury  - Consider OT/PT consult to assist with strengthening/mobility  3/25/2021 1132 by Nilton Tirado RN  Outcome: Progressing  3/25/2021 1131 by Nilton Tirado RN  Outcome: Progressing  Goal: Maintain or return to baseline ADL function  Description: INTERVENTIONS:  -  Assess patient's ability to carry out ADLs; assess patient's baseline for ADL function and identify physical deficits which impact ability to perform ADLs (bathing, care of mouth/teeth, toileting, grooming, dressing, etc )  - Assess/evaluate cause of self-care deficits   - Assess range of motion  - Assess patient's mobility; develop plan if impaired  - Assess patient's need for assistive devices and provide as appropriate  - Encourage maximum independence but intervene and supervise when necessary  - Involve family in performance of ADLs  - Assess for home care needs following discharge   - Consider OT consult to assist with ADL evaluation and planning for discharge  - Provide patient education as appropriate  3/25/2021 1132 by Nilton Tirado RN  Outcome: Progressing  3/25/2021 1131 by Nilton Tirado RN  Outcome: Progressing  Goal: Maintain or return mobility status to optimal level  Description: INTERVENTIONS:  - Assess patient's baseline mobility status (ambulation, transfers, stairs, etc )    - Identify cognitive and physical deficits and behaviors that affect mobility  - Identify mobility aids required to assist with transfers and/or ambulation (gait belt, sit-to-stand, lift, walker, cane, etc )  - [place] fall precautions as indicated by assessment  - Record patient progress and toleration of activity level on Mobility SBAR; progress patient to next Phase/Stage  - Instruct patient to call for assistance with activity based on assessment  - Consider rehabilitation consult to assist with strengthening/weightbearing, etc   3/25/2021 1132 by Nilton Tirado RN  Outcome: Progressing  3/25/2021 1131 by Nilton Tirado RN  Outcome: Progressing     Problem: DISCHARGE PLANNING  Goal: Discharge to home or other facility with appropriate resources  Description: INTERVENTIONS:  - Identify barriers to discharge w/patient and caregiver  - Arrange for needed discharge resources and transportation as appropriate  - Identify discharge learning needs (meds, wound care, etc )  - Arrange for interpretive services to assist at discharge as needed  - Refer to Case Management Department for coordinating discharge planning if the patient needs post-hospital services based on physician/advanced practitioner order or complex needs related to functional status, cognitive ability, or social support system  3/25/2021 1132 by Martin Jorgensen RN  Outcome: Progressing  3/25/2021 1131 by Martin Jorgensen RN  Outcome: Progressing     Problem: Knowledge Deficit  Goal: Patient/family/caregiver demonstrates understanding of disease process, treatment plan, medications, and discharge instructions  Description: Complete learning assessment and assess knowledge base  Interventions:  - Provide teaching at level of understanding  - Provide teaching via preferred learning methods  3/25/2021 1132 by Martin Jorgensen RN  Outcome: Progressing  3/25/2021 1131 by Martin Jorgensen RN  Outcome: Progressing     Problem: Potential for Falls  Goal: Patient will remain free of falls  Description: INTERVENTIONS:  - Assess patient frequently for physical needs  -  Identify cognitive and physical deficits and behaviors that affect risk of falls    -  [place] fall precautions as indicated by assessment   - Educate patient/family on patient safety including physical limitations  - Instruct patient to call for assistance with activity based on assessment  - Modify environment to reduce risk of injury  - Consider OT/PT consult to assist with strengthening/mobility  3/25/2021 1132 by Martin Jorgensen RN  Outcome: Progressing  3/25/2021 1131 by Martin Jorgensen RN  Outcome: Progressing     Problem: GENITOURINARY - ADULT  Goal: Maintains or returns to baseline urinary function  Description: INTERVENTIONS:  - Assess urinary function  - Encourage oral fluids to ensure adequate hydration if ordered  - Administer IV fluids as ordered to ensure adequate hydration  - Administer ordered medications as needed  - Offer frequent toileting  - Follow urinary retention protocol if ordered  Outcome: Progressing  Goal: Absence of urinary retention  Description: INTERVENTIONS:  - Assess patients ability to void and empty bladder  - Monitor I/O  - Bladder scan as needed  - Discuss with physician/AP medications to alleviate retention as needed  - Discuss catheterization for long term situations as appropriate  Outcome: Progressing  Goal: Urinary catheter remains patent  Description: INTERVENTIONS:  - Assess patency of urinary catheter  - If patient has a chronic dickey, consider changing catheter if non-functioning  - Follow guidelines for intermittent irrigation of non-functioning urinary catheter  Outcome: Progressing     Problem: INFECTION - ADULT  Goal: Absence of fever/infection during neutropenic period  Description: INTERVENTIONS:  - Monitor WBC    3/25/2021 1132 by Cindy Brooke RN  Outcome: Completed  3/25/2021 1131 by Cnidy Brooke RN  Outcome: Progressing

## 2021-03-25 NOTE — ASSESSMENT & PLAN NOTE
Continue flomax, and ciprofloxacin for recent intervention   - Discussed risks and benefits, urology would like to keep ciprofloxacin due to complicated UTI / instrumentation  Fortuantely no growth

## 2021-03-25 NOTE — PROGRESS NOTES
[address] [place] [address]  Progress Note Bridgette Persaud 1960, 56 y o  male MRN: [id number]  Unit/Bed#: E5 -01 Encounter: [id number]  Primary Care Provider: Brittnee Reilly DO   Date and time admitted to hospital: 3/23/2021  5:45 AM    * Hematuria  Assessment & Plan  56year old male with BPH / bladder outlet obstructive symproms refractory to maximum dose of alpha blocker therapy who recently underwent cystoscopy with insertion urolift implants presented due to hematuria  Hematuria possibly due to BPH / recent urolift  He underwent cystoscopy this afternoon to evacuate clots, fulgeration of bladder neck / prostatic urethra  - Will await urology reevaluation prior to discharge consideration  HTN (hypertension)  Assessment & Plan  Continue lisinopril    BPH (benign prostatic hyperplasia)  Assessment & Plan  Continue flomax, and ciprofloxacin for recent intervention   - Discussed risks and benefits, urology would like to keep ciprofloxacin due to complicated UTI / instrumentation  Fortuantely no growth  Restless leg syndrome  Assessment & Plan  Continue requip    VTE Pharmacologic Prophylaxis:   Pharmacologic: Pharmacologic VTE Prophylaxis contraindicated due to hematuria  Mechanical VTE Prophylaxis in Place: Yes    Patient Centered Rounds: I have performed bedside rounds with nursing staff today  Discussions with Specialists or Other Care Team Provider: nursing, urology    Education and Discussions with Family / Patient: patient    Time Spent for Care: 30 minutes  More than 50% of total time spent on counseling and coordination of care as described above      Current Length of Stay: 1 day(s)    Current Patient Status: Inpatient   Certification Statement: The patient will continue to require additional inpatient hospital stay due to urology reevaluation, hematuria management    Discharge Plan: active    Code Status: Level 1 - Full Code      Subjective:   Patient seen and examined at bedside  Prior to cystoscopy  He was still having some bloody urine much improved compared to admission  He understands that he will have to remain admitted postoperatively with a possible discharge date tomorrow  Objective:     Vitals:   Temp (24hrs), Av 4 °F (36 3 °C), Min:96 9 °F (36 1 °C), Max:97 9 °F (36 6 °C)    Temp:  [96 9 °F (36 1 °C)-97 9 °F (36 6 °C)] 97 5 °F (36 4 °C)  HR:  [68-93] 68  Resp:  [12-21] 20  BP: (153-168)/(79-98) 168/98  SpO2:  [94 %-98 %] 96 %  There is no height or weight on file to calculate BMI  Input and Output Summary (last 24 hours): Intake/Output Summary (Last 24 hours) at 3/25/2021 1650  Last data filed at 3/25/2021 1519  Gross per 24 hour   Intake 2000 ml   Output 6650 ml   Net -4650 ml       Physical Exam:     Physical Exam  Vitals signs reviewed  Constitutional:       General: He is not in acute distress  Appearance: He is not ill-appearing  HENT:      Head: Normocephalic  Nose: Nose normal       Mouth/Throat:      Mouth: Mucous membranes are moist    Eyes:      General: No scleral icterus  Cardiovascular:      Rate and Rhythm: Normal rate and regular rhythm  Pulmonary:      Effort: Pulmonary effort is normal  No respiratory distress  Abdominal:      General: There is no distension  Palpations: Abdomen is soft  Tenderness: There is no abdominal tenderness  Genitourinary:     Penis: Normal        Comments: Chambers catheter in place  Drainage bag with reddish pink tint  Musculoskeletal: Normal range of motion  Skin:     General: Skin is warm  Neurological:      General: No focal deficit present  Mental Status: He is alert     Psychiatric:         Mood and Affect: Mood normal          Behavior: Behavior normal        Additional Data:     Labs:    Results from last 7 days   Lab Units 21  0554   WBC Thousand/uL 8 05   HEMOGLOBIN g/dL 14 2   HEMATOCRIT % 43 0   PLATELETS Thousands/uL 149   NEUTROS PCT % 66   LYMPHS PCT % 19   MONOS PCT % 8   EOS PCT % 5     Results from last 7 days   Lab Units 03/25/21  0554   SODIUM mmol/L 141   POTASSIUM mmol/L 3 9   CHLORIDE mmol/L 107   CO2 mmol/L 24   BUN mg/dL 13   CREATININE mg/dL 1 14   ANION GAP mmol/L 10   CALCIUM mg/dL 8 3   GLUCOSE RANDOM mg/dL 106         Results from last 7 days   Lab Units 03/25/21  1242   POC GLUCOSE mg/dl 102                   * I Have Reviewed All Lab Data Listed Above  * Additional Pertinent Lab Tests Reviewed:  [facility] 64 Admission Reviewed    Imaging:    Imaging Reports Reviewed Today Include: No new imaging, cystoscopy report    Recent Cultures (last 7 days):     Results from last 7 days   Lab Units 03/23/21  0748   URINE CULTURE  No Growth <1000 cfu/mL       Last 24 Hours Medication List:   Current Facility-Administered Medications   Medication Dose Route Frequency Provider Last Rate    acetaminophen  650 mg Oral Q6H PRN Everlyn Fear, DO      [MAR Hold] atorvastatin  10 mg Oral Daily With Russel Cabello MD      ciprofloxacin  500 mg Oral Q12H [facility] Duane Wolf, DO      [MAR Hold] finasteride  5 mg Oral Daily Santiago Nelson MD      [facility] Hold] fluticasone  1 spray Each Nare Daily Santiago Nelson MD      [facility] Hold] lisinopril  10 mg Oral Daily Santiago Nelson MD      [facility] Hold] loratadine  5 mg Oral Daily Santiago Nelson MD      [facility] Hold] magnesium hydroxide  30 mL Oral Daily PRN Everlyn Fear, DO      [facility] Hold] melatonin  1 5 mg Oral HS Santiago Nelson MD      ondansetron  4 mg Intravenous Q6H PRN Everlyn Fear, DO      [facility] Hold] oxybutynin  5 mg Oral TID Santiago Nelson MD      oxyCODONE-acetaminophen  1 tablet Oral Q4H PRN Everlyn Fear, DO      [MAR Hold] rOPINIRole  0 75 mg Oral HS Santiago Nelson MD      sodium chloride  75 mL/hr Intravenous Continuous Santiago Nelson MD 75 mL/hr (03/25/21 0540)    sodium chloride  3,000 mL Irrigation Continuous Duane Wolf, DO      [MAR Hold] tamsulosin  0 4 mg Oral Daily With Oddis Malady Tomasz Tsai MD          Today, Patient Was Seen By: Carlo Madison MD    ** Please Note: Dictation voice to text software may have been used in the creation of this document   **

## 2021-03-25 NOTE — INTERVAL H&P NOTE
H&P reviewed  After examining the patient I find no changes in the patients condition since the H&P had been written      Vitals:    03/25/21 0717   BP: 154/89   Pulse: 93   Resp: 18   Temp: (!) 97 °F (36 1 °C)   SpO2: 97%

## 2021-03-25 NOTE — OP NOTE
OPERATIVE REPORT    PATIENT NAME: El Gates    :  1960  MRN: [id number]  Pt Location: [facility] OR ROOM 07    SURGERY DATE:   3/25/2021  Surgeon(s) and Role:      Teri Fraire,  - Primary    Preop Diagnosis:  Gross hematuria [R31 0]  Post-Op Diagnosis Codes:     Gross hematuria [R31 0]  Procedure(s):  CYSTOSCOPY COMPLICATED EVACUATION OF CLOTS; FULGERATION OF BLADDERNECK, PROSTATIC URETHRA    Specimen(s):  None    Estimated Blood Loss:   Minimal    Drains:  Urethral Catheter Non-latex 20 Fr  (Active)   Number of days: 7       Urethral Catheter Non-latex; Three way 24 Fr  (Active)   Number of days: 0       Continuous Bladder Irrigation (Active)   Site Assessment Clean;Skin intact 21 0807   Securement Method Securing device (Describe) 21 0807   Rate Slow 21 0807   Irrigant Normal saline 21 0807   CBI Irrigation Intake (mL) 3000 mL 21 1159   CBI Chambers Output (mL) 3550 mL 21 1159   CBI Net Output (mL) 550 mL 21 1159   Chambers Output Appearance Clear; Other (comment) 21 1159   Number of days: 2       Continuous Bladder Irrigation Three-way (Active)   Number of days: 0       Anesthesia Type:   General    Operative Indications:  Gross hematuria refractory to conservative management with manual and continuous bladder irrigation    Operative Findings:  Bleeding at the left bladder neck inside the left bladder neck distal to the vesicourethral junction on the left  Prosthetic urethral bleeding proximal to the right side of the verumontanum involving the floor and right lateral lobe of the prostate    Complications:   None known    Procedure and Technique:  Patient was identified  General anesthetic was administered  Patient was placed in dorsal lithotomy position  Genitals were prepped and draped  Time-out was taken  Rigid cystoscopy was performed, a large amount of clots were seen within the urinary bladder  Clots were evacuated using an Yessenia evacuator, complicated clot evacuation was done  Examination of bladder failed to reveal a source of urinary bleeding  Back wall of the catheter did demonstrate evidence of catheter cystitis  Cystoscope was withdrawn  Bleeding was seen inside the bladder neck on the left side  This was fulgurated successfully  Additional examination of the prostatic revealed additional bleeding in the prostate itself  This involved the floor the prosthetic urethra proximal to the verumontanum and right lateral lobe of the prostate  Other bleeding areas within the prosthetic urethra were also cauterized successfully  At the end of the case there was no additional bleeding  Excellent hemostasis was achieved  A 24 [place] 3 way Chambers catheter was placed to gravity drainage with 30 cc in the balloon  Traction was applied to the catheter 10 minutes postoperatively to ensure hemostasis within the prosthetic urethra  Tension was released thereafter  Catheter was then left to gravity drainage with continuous bladder irrigation running  Patient went to recovery room in good postoperative condition having tolerated the procedure well  Catheter will be left indwelling until Monday March 29th 2021  At which point it will be then removed in the office electively on an outpatient basis  In the interim patient will remain on oral antibiotics in view of his urethral implants and indwelling urethral catheter         I was present for the entire procedure    Patient Disposition:  PACU     SIGNATURE: Rito Cabrera DO  DATE: March 25, 2021  TIME: 2:50 PM

## 2021-03-26 VITALS
SYSTOLIC BLOOD PRESSURE: 145 MMHG | HEIGHT: 70 IN | OXYGEN SATURATION: 92 % | RESPIRATION RATE: 18 BRPM | TEMPERATURE: 97.7 F | WEIGHT: 240.3 LBS | HEART RATE: 75 BPM | DIASTOLIC BLOOD PRESSURE: 84 MMHG | BODY MASS INDEX: 34.4 KG/M2

## 2021-03-26 LAB
ANION GAP SERPL CALCULATED.3IONS-SCNC: 12 MMOL/L (ref 4–13)
BASOPHILS # BLD AUTO: 0.04 THOUSANDS/ΜL (ref 0–0.1)
BASOPHILS NFR BLD AUTO: 0 % (ref 0–1)
BUN SERPL-MCNC: 12 MG/DL (ref 5–25)
CALCIUM SERPL-MCNC: 8.9 MG/DL (ref 8.3–10.1)
CHLORIDE SERPL-SCNC: 107 MMOL/L (ref 100–108)
CO2 SERPL-SCNC: 21 MMOL/L (ref 21–32)
CREAT SERPL-MCNC: 0.97 MG/DL (ref 0.6–1.3)
EOSINOPHIL # BLD AUTO: 0.06 THOUSAND/ΜL (ref 0–0.61)
EOSINOPHIL NFR BLD AUTO: 1 % (ref 0–6)
ERYTHROCYTE [DISTWIDTH] IN BLOOD BY AUTOMATED COUNT: 12 % (ref 11.6–15.1)
GFR SERPL CREATININE-BSD FRML MDRD: 83 ML/MIN/1.73SQ M
GLUCOSE SERPL-MCNC: 113 MG/DL (ref 65–140)
HCT VFR BLD AUTO: 45 % (ref 36.5–49.3)
HGB BLD-MCNC: 15 G/DL (ref 12–17)
IMM GRANULOCYTES # BLD AUTO: 0.07 THOUSAND/UL (ref 0–0.2)
IMM GRANULOCYTES NFR BLD AUTO: 1 % (ref 0–2)
LYMPHOCYTES # BLD AUTO: 1.17 THOUSANDS/ΜL (ref 0.6–4.47)
LYMPHOCYTES NFR BLD AUTO: 10 % (ref 14–44)
MCH RBC QN AUTO: 31.8 PG (ref 26.8–34.3)
MCHC RBC AUTO-ENTMCNC: 33.3 G/DL (ref 31.4–37.4)
MCV RBC AUTO: 96 FL (ref 82–98)
MONOCYTES # BLD AUTO: 0.7 THOUSAND/ΜL (ref 0.17–1.22)
MONOCYTES NFR BLD AUTO: 6 % (ref 4–12)
NEUTROPHILS # BLD AUTO: 10.02 THOUSANDS/ΜL (ref 1.85–7.62)
NEUTS SEG NFR BLD AUTO: 82 % (ref 43–75)
NRBC BLD AUTO-RTO: 0 /100 WBCS
PLATELET # BLD AUTO: 171 THOUSANDS/UL (ref 149–390)
PMV BLD AUTO: 11.2 FL (ref 8.9–12.7)
POTASSIUM SERPL-SCNC: 4 MMOL/L (ref 3.5–5.3)
RBC # BLD AUTO: 4.71 MILLION/UL (ref 3.88–5.62)
SODIUM SERPL-SCNC: 140 MMOL/L (ref 136–145)
WBC # BLD AUTO: 12.06 THOUSAND/UL (ref 4.31–10.16)

## 2021-03-26 PROCEDURE — 80048 BASIC METABOLIC PNL TOTAL CA: CPT | Performed by: STUDENT IN AN ORGANIZED HEALTH CARE EDUCATION/TRAINING PROGRAM

## 2021-03-26 PROCEDURE — 99239 HOSP IP/OBS DSCHRG MGMT >30: CPT | Performed by: STUDENT IN AN ORGANIZED HEALTH CARE EDUCATION/TRAINING PROGRAM

## 2021-03-26 PROCEDURE — 85025 COMPLETE CBC W/AUTO DIFF WBC: CPT | Performed by: STUDENT IN AN ORGANIZED HEALTH CARE EDUCATION/TRAINING PROGRAM

## 2021-03-26 RX ORDER — OXYBUTYNIN CHLORIDE 5 MG/1
5 TABLET ORAL 3 TIMES DAILY
Qty: 15 TABLET | Refills: 0 | Status: SHIPPED | OUTPATIENT
Start: 2021-03-26 | End: 2022-04-06 | Stop reason: ALTCHOICE

## 2021-03-26 RX ORDER — CIPROFLOXACIN 500 MG/1
500 TABLET, FILM COATED ORAL EVERY 12 HOURS SCHEDULED
Qty: 14 TABLET | Refills: 0
Start: 2021-03-26 | End: 2021-04-02

## 2021-03-26 RX ADMIN — LORATADINE 5 MG: 10 TABLET ORAL at 09:37

## 2021-03-26 RX ADMIN — LISINOPRIL 10 MG: 10 TABLET ORAL at 09:37

## 2021-03-26 RX ADMIN — OXYBUTYNIN CHLORIDE 5 MG: 5 TABLET ORAL at 09:37

## 2021-03-26 RX ADMIN — FINASTERIDE 5 MG: 5 TABLET, FILM COATED ORAL at 09:37

## 2021-03-26 NOTE — ASSESSMENT & PLAN NOTE
56year old male with BPH / bladder outlet obstructive symproms refractory to maximum dose of alpha blocker therapy who recently underwent cystoscopy with insertion urolift implants presented due to hematuria  Hematuria possibly due to BPH / recent urolift  S/p intervention yesterday  - Cleared for discharge by urology  Outpatient follow-up on Monday  - Continue antibiotics as per urology recommendations at least through Monday  Discussed risks and benefits with him regarding the prolonged fluoroquinolone use especially risk of tendon rupture  He is aware that the risk of UTI is much more likely given his dickey and instrumentation

## 2021-03-26 NOTE — DISCHARGE SUMMARY
[facility]  Discharge- [facility] 1960, 56 y o  male MRN: [id number]  Unit/Bed#: E5 -01 Encounter: [id number]  Primary Care Provider: Judy Goldstein DO   Date and time admitted to hospital: 3/23/2021  5:45 AM    * Hematuria  Assessment & Plan  56year old male with BPH / bladder outlet obstructive symproms refractory to maximum dose of alpha blocker therapy who recently underwent cystoscopy with insertion urolift implants presented due to hematuria  Hematuria possibly due to BPH / recent urolift  S/p intervention yesterday  - Cleared for discharge by urology  Outpatient follow-up on Monday  - Continue antibiotics as per urology recommendations at least through Monday  Discussed risks and benefits with him regarding the prolonged fluoroquinolone use especially risk of tendon rupture  He is aware that the risk of UTI is much more likely given his dickey and instrumentation  HTN (hypertension)  Assessment & Plan  Continue lisinopril    BPH (benign prostatic hyperplasia)  Assessment & Plan  Continue flomax    Class 2 obesity due to excess calories without serious comorbidity with body mass index (BMI) of 35 0 to 35 9 in adult  Assessment & Plan  Encourage lifestyle modifications    Restless leg syndrome  Assessment & Plan  Continue requip      Discharging Physician / Practitioner: Carlo Madison MD  PCP: Judy Goldstein DO  Admission Date:   Admission Orders (From admission, onward)     Ordered        03/24/21 1158  Inpatient Admission  Once         03/23/21 1115  Place in Observation  Once                   Discharge Date: 03/26/21    Resolved Problems  Date Reviewed: 3/26/2021    None          Consultations During Hospital Stay:  · urology    Procedures Performed:   CYSTOSCOPY COMPLICATED EVACUATION OF CLOTS; FULGERATION OF BLADDERNECK, PROSTATIC URETHRA    Significant Findings / Test Results:   · Imaging  · CT renal protocol    1   Dickey catheter noted within the urinary bladder with a large intraluminal filling defect most consistent with large blood clot  (Approximately 7 4 x 8 3 x 5 8 cm)  2  Patient status post Urolift procedure  The 2 left-sided linear radiopaque densities project slightly beyond the anterior prostate margin toward the posterior wall of the urinary bladder however do not project into the bladder lumen  3  No hydronephrosis  Bilateral renal cysts  4  Colonic diverticulosis    Incidental Findings:   · As written above    Test Results Pending at Discharge (will require follow up): · None     Outpatient Tests Requested:  · Follow-up: urology    Complications:  None    Reason for Admission: hematuria    Hospital Course:     Adenike Floyd is a 56 y o  male patient who originally presented to the hospital on 3/23/2021 due to hematuria / urinary retention  Patient has a past medical history of hypertension, restless leg syndrome, and BPH  He recently underwent cystoscopy with insertion mural if implants  Unfortunately, he developed urinary retention and gross hematuria after this  He was admitted under the [facility] service with Urology assisting  His hemoglobin levels fortunately continued to remain stable  He underwent cystoscopy, evacuation of clots, fulguration of bladder neck, prostatic urethra  Today he continues to have a Chambers catheter in place and was cleared for discharge by Urology  He will be seen on an outpatient basis this Monday for proper transition of care  Urology wants him to continue his ciprofloxacin which he still has available at home  I spoke to him about the risks of prolonged fluoroquinolone use including tendon rupture  He does understand however that this is per his urologist preference given his recent instrumentation and concern for a urinary tract infection  He agrees to continue his antibiotic until he is seen on Monday for re-evaluation      Please see above list of diagnoses and related plan for additional information  Condition at Discharge: fair     Discharge Day Visit / Exam:     Subjective:  Patient seen and examined at bedside  No acute events or complaints overnight  Comfortable  Eager to be discharged  Vitals: Blood Pressure: 145/84 (03/26/21 0742)  Pulse: 75 (03/26/21 0742)  Temperature: 97 7 °F (36 5 °C) (03/26/21 0742)  Temp Source: Temporal (03/26/21 0742)  Respirations: 18 (03/26/21 0742)  SpO2: 92 % (03/26/21 0742)  Exam:   Physical Exam  Vitals signs reviewed  HENT:      Head: Normocephalic  Nose: Nose normal       Mouth/Throat:      Mouth: Mucous membranes are moist    Eyes:      General: No scleral icterus  Extraocular Movements: Extraocular movements intact  Cardiovascular:      Rate and Rhythm: Normal rate and regular rhythm  Pulmonary:      Effort: Pulmonary effort is normal  No respiratory distress  Abdominal:      General: There is no distension  Palpations: Abdomen is soft  Tenderness: There is no abdominal tenderness  Genitourinary:     Penis: Normal        Comments: Chambers catheter in place with clear urine flowing to his bag  Urine pink at the bag site  Musculoskeletal: Normal range of motion  Right lower leg: No edema  Left lower leg: No edema  Skin:     General: Skin is warm  Neurological:      Mental Status: He is alert  Mental status is at baseline  Psychiatric:         Mood and Affect: Mood normal          Behavior: Behavior normal        Discharge instructions/Information to patient and family:   See after visit summary for information provided to patient and family  Provisions for Follow-Up Care:  See after visit summary for information related to follow-up care and any pertinent home health orders        Disposition:     Home    For Discharges to [facility] SNF:   · Not Applicable to this Patient - Not Applicable to this Patient    Planned Readmission: No     Discharge Statement:  I spent 32 minutes discharging the patient  This time was spent on the day of discharge  I had direct contact with the patient on the day of discharge  Greater than 50% of the total time was spent examining patient, answering all patient questions, arranging and discussing plan of care with patient as well as directly providing post-discharge instructions  Additional time then spent on discharge activities  Discharge Medications:  See after visit summary for reconciled discharge medications provided to patient and family        ** Please Note: This note has been constructed using a voice recognition system **

## 2021-03-26 NOTE — ASSESSMENT & PLAN NOTE
Continue lisinopril VSS  flaps normal color and refill  vioptix stable, dc'ed  abd intact  CBC pending  ambulate today

## 2021-03-26 NOTE — DISCHARGE INSTR - AVS FIRST PAGE
Follow-up with Dr Arlyn Haynes on Monday as scheduled  Complete your antibiotic therapy as we discussed through Monday  Will defer to your urologist if continued antibiotic therapy would still be necessary at that point  Except for a new prescription  I have made no changes to your home medications  Continue your medications as you previously took them  New prescription for oxybutynin 5 days was sent per urology

## 2021-03-26 NOTE — PLAN OF CARE
Problem: PAIN - ADULT  Goal: Verbalizes/displays adequate comfort level or baseline comfort level  Description: Interventions:  - Encourage patient to monitor pain and request assistance  - Assess pain using appropriate pain scale  - Administer analgesics based on type and severity of pain and evaluate response  - Implement non-pharmacological measures as appropriate and evaluate response  - Consider cultural and social influences on pain and pain management  - Notify physician/advanced practitioner if interventions unsuccessful or patient reports new pain  Outcome: Progressing     Problem: INFECTION - ADULT  Goal: Absence or prevention of progression during hospitalization  Description: INTERVENTIONS:  - Assess and monitor for signs and symptoms of infection  - Monitor lab/diagnostic results  - Monitor all insertion sites, i e  indwelling lines, tubes, and drains  - Monitor endotracheal if appropriate and nasal secretions for changes in amount and color  - [place] appropriate cooling/warming therapies per order  - Administer medications as ordered  - Instruct and encourage patient and family to use good hand hygiene technique  - Identify and instruct in appropriate isolation precautions for identified infection/condition  Outcome: Progressing     Problem: SAFETY ADULT  Goal: Patient will remain free of falls  Description: INTERVENTIONS:  - Assess patient frequently for physical needs  -  Identify cognitive and physical deficits and behaviors that affect risk of falls    -  [place] fall precautions as indicated by assessment   - Educate patient/family on patient safety including physical limitations  - Instruct patient to call for assistance with activity based on assessment  - Modify environment to reduce risk of injury  - Consider OT/PT consult to assist with strengthening/mobility  Outcome: Progressing  Goal: Maintain or return to baseline ADL function  Description: INTERVENTIONS:  -  Assess patient's ability to carry out ADLs; assess patient's baseline for ADL function and identify physical deficits which impact ability to perform ADLs (bathing, care of mouth/teeth, toileting, grooming, dressing, etc )  - Assess/evaluate cause of self-care deficits   - Assess range of motion  - Assess patient's mobility; develop plan if impaired  - Assess patient's need for assistive devices and provide as appropriate  - Encourage maximum independence but intervene and supervise when necessary  - Involve family in performance of ADLs  - Assess for home care needs following discharge   - Consider OT consult to assist with ADL evaluation and planning for discharge  - Provide patient education as appropriate  Outcome: Progressing  Goal: Maintain or return mobility status to optimal level  Description: INTERVENTIONS:  - Assess patient's baseline mobility status (ambulation, transfers, stairs, etc )    - Identify cognitive and physical deficits and behaviors that affect mobility  - Identify mobility aids required to assist with transfers and/or ambulation (gait belt, sit-to-stand, lift, walker, cane, etc )  - [place] fall precautions as indicated by assessment  - Record patient progress and toleration of activity level on Mobility SBAR; progress patient to next Phase/Stage  - Instruct patient to call for assistance with activity based on assessment  - Consider rehabilitation consult to assist with strengthening/weightbearing, etc   Outcome: Progressing     Problem: DISCHARGE PLANNING  Goal: Discharge to home or other facility with appropriate resources  Description: INTERVENTIONS:  - Identify barriers to discharge w/patient and caregiver  - Arrange for needed discharge resources and transportation as appropriate  - Identify discharge learning needs (meds, wound care, etc )  - Arrange for interpretive services to assist at discharge as needed  - Refer to Case Management Department for coordinating discharge planning if the patient needs post-hospital services based on physician/advanced practitioner order or complex needs related to functional status, cognitive ability, or social support system  Outcome: Progressing

## 2021-03-26 NOTE — PLAN OF CARE
Problem: PAIN - ADULT  Goal: Verbalizes/displays adequate comfort level or baseline comfort level  Description: Interventions:  - Encourage patient to monitor pain and request assistance  - Assess pain using appropriate pain scale  - Administer analgesics based on type and severity of pain and evaluate response  - Implement non-pharmacological measures as appropriate and evaluate response  - Consider cultural and social influences on pain and pain management  - Notify physician/advanced practitioner if interventions unsuccessful or patient reports new pain  3/26/2021 1519 by Meliton Srivastava RN  Outcome: Adequate for Discharge  3/26/2021 1128 by Meliton Srivastava RN  Outcome: Progressing     Problem: INFECTION - ADULT  Goal: Absence or prevention of progression during hospitalization  Description: INTERVENTIONS:  - Assess and monitor for signs and symptoms of infection  - Monitor lab/diagnostic results  - Monitor all insertion sites, i e  indwelling lines, tubes, and drains  - Monitor endotracheal if appropriate and nasal secretions for changes in amount and color  - [place] appropriate cooling/warming therapies per order  - Administer medications as ordered  - Instruct and encourage patient and family to use good hand hygiene technique  - Identify and instruct in appropriate isolation precautions for identified infection/condition  3/26/2021 1519 by Meliton Srivastava RN  Outcome: Adequate for Discharge  3/26/2021 1128 by Meliton Srivastava RN  Outcome: Progressing     Problem: SAFETY ADULT  Goal: Patient will remain free of falls  Description: INTERVENTIONS:  - Assess patient frequently for physical needs  -  Identify cognitive and physical deficits and behaviors that affect risk of falls    -  [place] fall precautions as indicated by assessment   - Educate patient/family on patient safety including physical limitations  - Instruct patient to call for assistance with activity based on assessment  - Modify environment to reduce risk of injury  - Consider OT/PT consult to assist with strengthening/mobility  3/26/2021 1519 by Juvenal Gurrola RN  Outcome: Adequate for Discharge  3/26/2021 1128 by Juvenal Gurrola RN  Outcome: Progressing  Goal: Maintain or return to baseline ADL function  Description: INTERVENTIONS:  -  Assess patient's ability to carry out ADLs; assess patient's baseline for ADL function and identify physical deficits which impact ability to perform ADLs (bathing, care of mouth/teeth, toileting, grooming, dressing, etc )  - Assess/evaluate cause of self-care deficits   - Assess range of motion  - Assess patient's mobility; develop plan if impaired  - Assess patient's need for assistive devices and provide as appropriate  - Encourage maximum independence but intervene and supervise when necessary  - Involve family in performance of ADLs  - Assess for home care needs following discharge   - Consider OT consult to assist with ADL evaluation and planning for discharge  - Provide patient education as appropriate  3/26/2021 1519 by Juvenal Gurrola RN  Outcome: Adequate for Discharge  3/26/2021 1128 by Juvenal Gurrola RN  Outcome: Progressing  Goal: Maintain or return mobility status to optimal level  Description: INTERVENTIONS:  - Assess patient's baseline mobility status (ambulation, transfers, stairs, etc )    - Identify cognitive and physical deficits and behaviors that affect mobility  - Identify mobility aids required to assist with transfers and/or ambulation (gait belt, sit-to-stand, lift, walker, cane, etc )  - [place] fall precautions as indicated by assessment  - Record patient progress and toleration of activity level on Mobility SBAR; progress patient to next Phase/Stage  - Instruct patient to call for assistance with activity based on assessment  - Consider rehabilitation consult to assist with strengthening/weightbearing, etc   3/26/2021 1519 by Vasyl Louie RN  Outcome: Adequate for Discharge  3/26/2021 1128 by Vasyl Louie RN  Outcome: Progressing     Problem: DISCHARGE PLANNING  Goal: Discharge to home or other facility with appropriate resources  Description: INTERVENTIONS:  - Identify barriers to discharge w/patient and caregiver  - Arrange for needed discharge resources and transportation as appropriate  - Identify discharge learning needs (meds, wound care, etc )  - Arrange for interpretive services to assist at discharge as needed  - Refer to Case Management Department for coordinating discharge planning if the patient needs post-hospital services based on physician/advanced practitioner order or complex needs related to functional status, cognitive ability, or social support system  3/26/2021 1519 by Vasyl Louie RN  Outcome: Adequate for Discharge  3/26/2021 1128 by Vasyl Louie RN  Outcome: Progressing     Problem: Knowledge Deficit  Goal: Patient/family/caregiver demonstrates understanding of disease process, treatment plan, medications, and discharge instructions  Description: Complete learning assessment and assess knowledge base  Interventions:  - Provide teaching at level of understanding  - Provide teaching via preferred learning methods  3/26/2021 1519 by Vasyl Louie RN  Outcome: Adequate for Discharge  3/26/2021 1128 by Vasyl Louie RN  Outcome: Progressing     Problem: Potential for Falls  Goal: Patient will remain free of falls  Description: INTERVENTIONS:  - Assess patient frequently for physical needs  -  Identify cognitive and physical deficits and behaviors that affect risk of falls    -  [place] fall precautions as indicated by assessment   - Educate patient/family on patient safety including physical limitations  - Instruct patient to call for assistance with activity based on assessment  - Modify environment to reduce risk of injury  - Consider OT/PT consult to assist with strengthening/mobility  3/26/2021 1519 by Jaycee Perze RN  Outcome: Adequate for Discharge  3/26/2021 1128 by Jaycee Perez RN  Outcome: Progressing     Problem: GENITOURINARY - ADULT  Goal: Maintains or returns to baseline urinary function  Description: INTERVENTIONS:  - Assess urinary function  - Encourage oral fluids to ensure adequate hydration if ordered  - Administer IV fluids as ordered to ensure adequate hydration  - Administer ordered medications as needed  - Offer frequent toileting  - Follow urinary retention protocol if ordered  3/26/2021 1519 by Jaycee Perez RN  Outcome: Adequate for Discharge  3/26/2021 1128 by Jaycee Perez RN  Outcome: Progressing  Goal: Absence of urinary retention  Description: INTERVENTIONS:  - Assess patients ability to void and empty bladder  - Monitor I/O  - Bladder scan as needed  - Discuss with physician/AP medications to alleviate retention as needed  - Discuss catheterization for long term situations as appropriate  3/26/2021 1519 by Jaycee Perez RN  Outcome: Adequate for Discharge  3/26/2021 1128 by Jaycee Perez RN  Outcome: Progressing  Goal: Urinary catheter remains patent  Description: INTERVENTIONS:  - Assess patency of urinary catheter  - If patient has a chronic dickey, consider changing catheter if non-functioning  - Follow guidelines for intermittent irrigation of non-functioning urinary catheter  3/26/2021 1519 by Jaycee Perez RN  Outcome: Adequate for Discharge  3/26/2021 1128 by Jaycee Perez RN  Outcome: Progressing

## 2021-03-26 NOTE — DISCHARGE INSTRUCTIONS
Acute Hematuria   WHAT YOU SHOULD KNOW:   Hematuria is blood in your urine from an injury or medical condition  Acute means the problem starts suddenly, worsens quickly, and lasts a short time  Your urine may be bright red to dark brown  Some common causes of hematuria are bladder infection, kidney stone, trauma to the kidneys or bladder, and some medications  Sometimes blood clots can block the urethra so that you cannot empty your bladder  AFTER YOU LEAVE:   Medicines:  Ask about these or other medicines you may need to treat the cause of your acute hematuria:  · Antibiotics: This medicine is given to fight or prevent an infection caused by bacteria  Always take your antibiotics exactly as ordered by your healthcare provider  Do not stop taking your medicine unless directed by your healthcare provider  Never save antibiotics or take leftover antibiotics that were given to you for another illness  · Take your medicine as directed  Call your healthcare provider if you think your medicine is not helping or if you have side effects  Tell him if you are allergic to any medicine  Keep a list of the medicines, vitamins, and herbs you take  Include the amounts, and when and why you take them  Bring the list or the pill bottles to follow-up visits  Carry your medicine list with you in case of an emergency  Increase the amount of fluid you drink:  Drink clear fluids to help flush the blood from your urinary tract  Follow instructions about how much fluid to drink  Follow up with your healthcare provider as directed: Your healthcare provider will tell you how often to come in for follow-up visits  He may refer you to a specialist, such as a urologist or nephrologist  The specialists may do tests or procedures to find more serious problems with your urinary system  Write down your questions so you remember to ask them during your visits     Contact your healthcare provider if:   · You have a fever that gets worse or does not go away with treatment  · You cannot keep liquids or medicines down  · Your urine gets darker, even after you drink extra liquids  · You have questions or concerns about your condition, treatment, or care  Seek care immediately or call 911 if:   · You have blood in your urine after a new injury, such as a fall  · You are urinating very small amounts or not at all  · You feel like you cannot empty your bladder  · You have severe back or side pain that does not go away with treatment  © 2014 [address] is for End User's use only and may not be sold, redistributed or otherwise used for commercial purposes  All illustrations and images included in CareNotes® are the copyrighted property of A D A M , Inc  or Dada Lisa  The above information is an  only  It is not intended as medical advice for individual conditions or treatments  Talk to your doctor, nurse or pharmacist before following any medical regimen to see if it is safe and effective for you  Chambers Catheter Placement and Care   WHAT YOU NEED TO KNOW:   A Chambers catheter is a sterile tube that is inserted into your bladder to drain urine  It is also called an indwelling urinary catheter  DISCHARGE INSTRUCTIONS:   Seek care immediately if:   · Your catheter comes out  · You suddenly have material that looks like sand in the tubing or drainage bag  · No urine is draining into the bag and you have checked the system  · You have pain in your hip, back, pelvis, or lower abdomen  · You are confused or cannot think clearly  Call your doctor or urologist if:   · You have a fever  · You have bladder spasms for more than 1 day after the catheter is placed  · You see blood in the tubing or drainage bag  · You have a rash or itching where the catheter tube is secured to your skin      · Urine leaks from or around the catheter, tubing, or drainage bag     · The closed drainage system has accidently come open or apart  · You see a layer of crystals inside the tubing  · You have questions or concerns about your condition or care  Care for your catheter and drainage bag: You can reduce your risk for infection and injury by caring for your catheter and drainage bag properly  · Wash your hands often  Wash before and after you touch your catheter, tubing, or drainage bag  Use soap and water  Wear clean disposable gloves when you care for your catheter or disconnect the drainage bag  Wash your hands before you prepare or eat food  · Clean your genital area 2 times every day  Clean your catheter area and anal opening after every bowel movement  ? For men:  Use a soapy cloth to clean the tip of your penis  Start where the catheter enters  Wipe backward making sure to pull back the foreskin  Then use a cloth with clear water in the same direction to clean away the soap  ? For women:  Use a soapy cloth to clean the area that the catheter enters your body  Make sure to separate your labia and wipe toward the anus  Then use a cloth with clear water and wipe in the same direction  · Secure the catheter tube  so you do not pull or move the catheter  This helps prevent pain and bladder spasms  Healthcare providers will show you how to use medical tape or a strap to secure the catheter tube to your body  · Keep a closed drainage system  Your catheter should always be attached to the drainage bag to form a closed system  Do not disconnect any part of the closed system unless you need to change the bag  · Keep the drainage bag below the level of your waist   This helps stop urine from moving back up the tubing and into your bladder  Do not loop or kink the tubing  This can cause urine to back up and collect in your bladder  Do not let the drainage bag touch or lie on the floor  · Empty the drainage bag when needed    The weight of a full drainage bag can be painful  Empty the drainage bag every 3 to 6 hours or when it is ? full  · Clean and change the drainage bag as directed  Ask your healthcare provider how often you should change the drainage bag and what cleaning solution to use  Wear disposable gloves when you change the bag  Do not allow the end of the catheter or tubing to touch anything  Clean the ends with an alcohol pad before you reconnect them  What to do if problems develop:   · No urine is draining into the bag:      ? Check for kinks in the tubing and straighten them out  ? Check the tape or strap used to secure the catheter tube to your skin  Make sure it is not blocking the tube  ? Make sure you are not sitting or lying on the tubing  ? Make sure the urine bag is hanging below the level of your waist     · Urine leaks from or around the catheter, tubing, or drainage bag:  Check if the closed drainage system has accidently come open or apart  Clean the catheter and tubing ends with a new alcohol pad and reconnect them  Follow up with your doctor or urologist as directed:  Write down your questions so you remember to ask them during your visits  © Copyright [address] [place] [address] Drive Information is for End User's use only and may not be sold, redistributed or otherwise used for commercial purposes  All illustrations and images included in CareNotes® are the copyrighted property of A D A M , Inc  or [facility] Se[facility] V[facility]   The above information is an  only  It is not intended as medical advice for individual conditions or treatments  Talk to your doctor, nurse or pharmacist before following any medical regimen to see if it is safe and effective for you

## 2021-03-26 NOTE — NURSING NOTE
Pt instructions given to pt and spouse  Catheter supplies given  IV removed  Pt walked out in stable condition with spouse and RN

## 2021-11-23 ENCOUNTER — TELEPHONE (OUTPATIENT)
Dept: SLEEP CENTER | Facility: CLINIC | Age: 61
End: 2021-11-23

## 2022-01-31 ENCOUNTER — OFFICE VISIT (OUTPATIENT)
Dept: SLEEP CENTER | Facility: CLINIC | Age: 62
End: 2022-01-31
Payer: COMMERCIAL

## 2022-01-31 VITALS
BODY MASS INDEX: 34.65 KG/M2 | DIASTOLIC BLOOD PRESSURE: 87 MMHG | SYSTOLIC BLOOD PRESSURE: 161 MMHG | HEART RATE: 81 BPM | WEIGHT: 242 LBS | HEIGHT: 70 IN

## 2022-01-31 DIAGNOSIS — E66.09 CLASS 2 OBESITY DUE TO EXCESS CALORIES WITHOUT SERIOUS COMORBIDITY WITH BODY MASS INDEX (BMI) OF 35.0 TO 35.9 IN ADULT: ICD-10-CM

## 2022-01-31 DIAGNOSIS — G25.81 RESTLESS LEG SYNDROME: ICD-10-CM

## 2022-01-31 DIAGNOSIS — I10 PRIMARY HYPERTENSION: ICD-10-CM

## 2022-01-31 DIAGNOSIS — R06.83 SNORING: ICD-10-CM

## 2022-01-31 DIAGNOSIS — G47.33 OSA (OBSTRUCTIVE SLEEP APNEA): Primary | ICD-10-CM

## 2022-01-31 PROCEDURE — 99215 OFFICE O/P EST HI 40 MIN: CPT | Performed by: INTERNAL MEDICINE

## 2022-01-31 RX ORDER — ROPINIROLE 0.25 MG/1
0.25 TABLET, FILM COATED ORAL 4 TIMES DAILY
Qty: 360 TABLET | Refills: 3 | Status: SHIPPED | OUTPATIENT
Start: 2022-01-31 | End: 2022-05-09 | Stop reason: ALTCHOICE

## 2022-01-31 NOTE — PROGRESS NOTES
Review of Systems      Genitourinary difficulty with erection   Cardiology none   Gastrointestinal none   Neurology none   Constitutional none   Integumentary rash or dry skin and itching   Psychiatry anxiety   Musculoskeletal legs twitching/jerking   Pulmonary snoring   ENT ringing in ears   Endocrine frequent urination   Hematological none

## 2022-01-31 NOTE — PROGRESS NOTES
Pulmonary/Sleep Follow Up Note   Eduardo Castanon 59 y o  male MRN: [id number]  1/31/2022      Assessment and Plan:    1  RAIMUNDO (obstructive sleep apnea)  Assessment & Plan:  · The patient has high pretest probability for obstructive sleep apnea given body mass index of 34 72, snoring, excessive daytime sleepiness, history of periodic limb movements  His sleep study in 2004 did not reveal obstructive sleep apnea however the patient slept very poorly during that study and did not achieve REM sleep besides he had significant weight gain since then he had was 195 lb back then now he is up to 242 lb  · Ordered a home study test    Orders:  -     Home Study; Future    2  Restless leg syndrome  Assessment & Plan:  · The patient has restless leg syndrome and periodic limb movements with a PLM index of 89 5 events per hour on his sleep study in 2004  · He continues to do well on Requip he takes anywhere between 1-4 tablets per night 0 25 mg with no reported side effects and no change in his dose over 16 years    Orders:  -     Home Study; Future  -     rOPINIRole (REQUIP) 0 25 mg tablet; Take 1 tablet (0 25 mg total) by mouth 4 (four) times a day    3  Class 2 obesity due to excess calories without serious comorbidity with body mass index (BMI) of 35 0 to 35 9 in adult  Assessment & Plan:  BMI 34 72      4  Snoring  Assessment & Plan:  Rule out underlying RAIMUNDO as detailed above      5  Primary hypertension  Assessment & Plan:  Untreated RAIMUNDO is risk for uncontrolled hypertension          Return in about 3 months (around 4/30/2022)  History of Present Illness   HPI:  Eduardo Castanon is a 59 y o  male who is here for follow-up on history of periodic limb movement and restless leg syndrome    The patient was diagnosed in 2004 secondary to excessive daytime sleepiness underwent an in-lab diagnostic sleep study he could not sleep during that night he slept very poorly however he had increased PLM my of 89 5 events per hour he was started on Requip ever since and he said that it has been coming his restless leg syndrome makes him sleep better  He still snores loudly and he has gained significant weight of since then  If he has nasal congestion excessive daytime sleepiness excessive movement during sleep, uncontrollable urge to move his legs, racing thoughts and overall his dissatisfied with his sleep  His [place] Scale is 3/24        Review of Systems      Genitourinary difficulty with erection   Cardiology none   Gastrointestinal none   Neurology none   Constitutional none   Integumentary rash or dry skin and itching   Psychiatry anxiety   Musculoskeletal legs twitching/jerking   Pulmonary snoring   ENT ringing in ears   Endocrine frequent urination   Hematological none           Historical Information   Past Medical History:   Diagnosis Date    Eczema     Enlarged prostate     Environmental allergies     Hypertension     RLS (restless legs syndrome)      Past Surgical History:   Procedure Laterality Date    COLONOSCOPY      HEMORROIDECTOMY      KIDNEY STONE SURGERY      NASAL SEPTUM SURGERY      AL CYSTOURETHRO W/IMPLANT N/A 3/18/2021    Procedure: CYSTOSCOPY WITH INSERTION UROLIFT;  Surgeon: Chiara Michaud DO;  Location: [facility];  Service: Urology    AL CYSTOURETHROSCOPY W/IRRIG & EVAC CLOTS N/A 3/25/2021    Procedure: CYSTOSCOPY COMPLICATED EVACUATION OF CLOTS; FULGERATION OF BLADDERNECK, PROSTATIC URETHRA;  Surgeon: Chiara Michaud DO;  Location: [facility];  Service: Urology    TONSILLECTOMY      URETERAL EXPLORATION      stricture    UVULOPALATOPHARYNGOPLASTY      VARICOSE VEIN SURGERY      VASCULAR SURGERY       Family History   Problem Relation Age of Onset    Stroke Mother     Lung cancer Father     Cancer Father     Other Maternal Grandmother         Cardiac    Diabetes Paternal Grandmother     Other Paternal Grandfather         Cardiac         Meds/Allergies     Current Outpatient Medications:   aspirin (ECOTRIN LOW STRENGTH) 81 mg EC tablet, Take 81 mg by mouth daily, Disp: , Rfl:     atorvastatin (LIPITOR) 10 mg tablet, Take 10 mg by mouth daily, Disp: , Rfl:     betamethasone dipropionate (DIPROSONE) 0 05 % cream, APPLY EVERY DAY TO FOOT, Disp: , Rfl: 5    clobetasol (TEMOVATE) 0 05 % external solution, Apply topically, Disp: , Rfl:     clotrimazole-betamethasone (LOTRISONE) 1-0 05 % cream, APPLY TWICE A DAY, Disp: , Rfl: 5    cyclobenzaprine (FLEXERIL) 10 mg tablet, 3 (three) times a day, Disp: , Rfl:     EPINEPHrine (EPIPEN) 0 3 mg/0 3 mL SOAJ, Inject as directed, Disp: , Rfl:     fexofenadine (ALLEGRA) 30 MG/5ML suspension, Take 30 mg by mouth, Disp: , Rfl:     finasteride (PROSCAR) 5 mg tablet, Take 5 mg by mouth daily, Disp: , Rfl:     halobetasol (ULTRAVATE) 0 05 % cream, APPLY TWICE A DAY, Disp: , Rfl: 5    lisinopril (ZESTRIL) 10 mg tablet, Take 10 mg by mouth daily, Disp: , Rfl:     melatonin 1 mg, Take 0 5 mg by mouth  , Disp: , Rfl:     mometasone (ELOCON) 0 1 % lotion, One drop affected ear canal daily at bedtime when necessary itching, Disp: 60 mL, Rfl: 0    mometasone (NASONEX) 50 mcg/act nasal spray, SPRAY 1 SPRAY INTO EACH NOSTRIL TWICE A DAY, Disp: 51 g, Rfl: 3    Olopatadine HCl 0 6 % SOLN, 1-2 sprays each nostril twice a day when necessary nasal congestion, Disp: 1 Bottle, Rfl: 5    rOPINIRole (REQUIP) 0 25 mg tablet, Take 1 tablet (0 25 mg total) by mouth 4 (four) times a day, Disp: 360 tablet, Rfl: 3    tadalafil (CIALIS) 5 MG tablet, Take 5 mg by mouth daily, Disp: , Rfl:     tamsulosin (FLOMAX) 0 4 mg, Take 0 4 mg by mouth daily with dinner, Disp: , Rfl:     Tuberculin-Allergy Syringes (B-D ALLERGY SYRINGE 1CC/28G) 28G X 1/2" 1 ML MISC, 3 syringes Parra every 3 weeks, Disp: 50 each, Rfl: 1    alfuzosin (UROXATRAL) 10 mg 24 hr tablet, Take 10 mg by mouth daily, Disp: , Rfl:     calcium carbonate-vitamin D (OSCAL-D) 500 mg-200 units per tablet, Take 1 tablet by mouth, Disp: , Rfl:     Cholecalciferol (VITAMIN D PO), Take 1 tablet by mouth (Patient not taking: Reported on 1/31/2022 ), Disp: , Rfl:     Coenzyme Q10 (COQ10 PO), Take by mouth (Patient not taking: Reported on 1/31/2022 ), Disp: , Rfl:     EPINEPHrine (EPIPEN) 0 3 mg/0 3 mL SOAJ, Inject 0 3 mL (0 3 mg total) into a muscle once for 1 dose, Disp: 0 6 mL, Rfl: 0    lisinopril-hydrochlorothiazide (PRINZIDE,ZESTORETIC) 10-12 5 MG per tablet, lisinopril 10 mg-hydrochlorothiazide 12 5 mg tablet, Disp: , Rfl:     Multiple Vitamins-Minerals (OCUVITE ADULT 50+ PO), Take by mouth (Patient not taking: Reported on 1/31/2022 ), Disp: , Rfl:     NON FORMULARY, , Disp: , Rfl:     oxybutynin (DITROPAN) 5 mg tablet, Take 1 tablet (5 mg total) by mouth 3 (three) times a day for 5 days, Disp: 15 tablet, Rfl: 0    potassium bicarbonate (K-LYTE) 25 MEQ disintegrating tablet, Take 25 mEq by mouth (Patient not taking: Reported on 1/31/2022 ), Disp: , Rfl:     Silodosin (Rapaflo) 8 MG CAPS, Rapaflo 8 mg capsule, Disp: , Rfl:     testosterone (AndroGel Pump) 1 62 % TD gel pump, AndroGel 20 25 mg/1 25 gram (1 62 %) transdermal gel pump, Disp: , Rfl:     triamcinolone (KENALOG) 0 1 % cream, APPLY TWICE A DAY (Patient not taking: Reported on 1/31/2022), Disp: , Rfl: 5  Allergies   Allergen Reactions    Food Color Orange [Yellow Dye - Food Allergy] Hives     orange flavored metamucil    Latex Itching     Band aides    Other Other (See Comments)     Seasonal rag weed dust mities    Psyllium Other (See Comments)     Orange Flavored    Telithromycin Other (See Comments)     BP dropped very low    Adhesive  [Medical Tape] Rash       Vitals: Blood pressure 161/87, pulse 81, height 5' 10" (1 778 m), weight 110 kg (242 lb)  Body mass index is 34 72 kg/m²          Physical Exam  General:  Awake alert and oriented x 3, conversant without conversational dyspnea, NAD, normal affect  HEENT:   Sclera noninjected, nonicteric OU, Nares patent,  no craniofacial abnormalities, Mucous membranes, moist, no oral lesions, normal dentition, Mallampati class 4  NECK:  Trachea midline, no accessory muscle use, no stridor,  JVP not elevated  CARDIAC: Reg, single s1/S2, no m/r/g  PULM: CTA bilaterally no wheezing, rhonchi or rales  ABD: Soft nontender, nondistended, no rebound, no rigidity, no guarding  EXT: No cyanosis, no clubbing, no edema, normal capillary refill  NEURO: no focal neurologic deficits, AAOx3, moving all extremities appropriately    Labs: I have personally reviewed pertinent lab results  , ABG: No results found for: PHART, OUN0APD, PO2ART, BLZ6LMB, Y4ESQRPF, BEART, SOURCE, BNP: No results found for: BNP, CBC: No results found for: WBC, HGB, HCT, MCV, PLT, ADJUSTEDWBC, MCH, MCHC, RDW, MPV, NRBC, CMP: No results found for: SODIUM, K, CL, CO2, ANIONGAP, BUN, CREATININE, GLUCOSE, CALCIUM, AST, ALT, ALKPHOS, PROT, BILITOT, EGFR, PT/INR: No results found for: PT, INR, Troponin: No results found for: TROPONINI  Lab Results   Component Value Date    WBC 12 06 (H) 03/26/2021    HGB 15 0 03/26/2021    HCT 45 0 03/26/2021    MCV 96 03/26/2021     03/26/2021     Lab Results   Component Value Date    GLUCOSE 119 09/29/2015    CALCIUM 8 9 03/26/2021     09/29/2015    K 4 0 03/26/2021    CO2 21 03/26/2021     03/26/2021    BUN 12 03/26/2021    CREATININE 0 97 03/26/2021     No results found for: IGE  Lab Results   Component Value Date    ALT 30 09/29/2015    AST 15 09/29/2015    ALKPHOS 69 09/29/2015    BILITOT 0 68 09/29/2015         Sleep studies: I have personally reviewed pertinent reports  PSG 2004  PLMI 89 5 events/hr  AHI 0   Slept very poorly             Scar Banda MD  [facility]       Portions of the record may have been created with voice recognition software   Occasional wrong word or "sound a like" substitutions may have occurred due to the inherent limitations of voice recognition software  Read the chart carefully and recognize, using context, where substitutions have occurred

## 2022-01-31 NOTE — PATIENT INSTRUCTIONS
Sleep Apnea   AMBULATORY CARE:   Sleep apnea  is a condition that causes you to stop breathing often during sleep  Types of sleep apnea:   · Obstructive sleep apnea (RAIMUNDO)  is the most common kind  The muscles and tissues around your throat relax and block air from passing through  Obesity, use of alcohol or cigarettes, or a family history are common causes  RAIMUNDO may increase your risk for complications after surgery  · Central sleep apnea (CSA)  means your brain does not send signals to the muscles that control breathing  You do not take a breath even though your airway is open  Common causes include medical conditions such as heart failure, being older than 40, or use of opioids  · Complex (or mixed) sleep apnea  means you have both obstructive and central sleep apnea  Common signs and symptoms:   · Loud snoring or long pauses in breathing    · Feeling sleepy, slow, and tired during the day    · Snorting, gasping, or choking while you sleep, and waking up suddenly because of these    · Feeling irritable during the day    · Dry mouth or a headache in the mornings    · Heavy night sweating    · A hard time thinking, remembering things, or focusing on your tasks the following day    Call your local emergency number (911 in the [address] [facility]) if:   · You have chest pain or trouble breathing  Call your doctor if:   · You have new or worsening signs or symptoms  · You have questions or concerns about your condition or care  Treatment  depends on the kind of apnea you have  · A mouth device  may be needed if you have mild sleep apnea  These are designed to keep your throat open  Ask your dentist or healthcare provider about the best mouth device for you  · A machine  may be used to help you get more air during sleep  A mask may be placed over your nose and mouth, or just your nose  The mask is hooked to the machine  You will get air through the mask      ? A continuous positive airway pressure (CPAP) machine is used to keep your airway open during sleep  The machine blows a gentle stream of air into the mask when you breathe  This helps keep your airway open so you can breathe more regularly  Extra oxygen may be given through the machine  ? A bilevel positive airway pressure (BiPAP) machine  gives air but lowers the pressure when you breathe out  ? An adaptive servo-ventilator (ASV)  is a machine that learns your usual breathing pattern  Then, it uses pressure to give you air and prevent stops in your breathing  · Surgery  to expand your airway or remove extra tissues may be needed  Surgery is usually only considered if other treatments do not work  Manage or prevent sleep apnea:   · Reach and maintain a healthy weight  Ask your healthcare provider what a healthy weight is for you  Ask him or her to help you create a safe weight loss plan if you are overweight  Even a small goal of a 10% weight loss can improve your symptoms  · Do not smoke  Nicotine and other chemicals in cigarettes and cigars can cause lung damage  Ask your healthcare provider for information if you currently smoke and need help to quit  E-cigarettes or smokeless tobacco still contain nicotine  Talk to your healthcare provider before you use these products  · Do not drink alcohol or take sedative medicine before you go to sleep  Alcohol and sedatives can relax the muscles and tissues around your throat  This can block the airflow to your lungs  · Sleep on your side or use pillows designed to prevent sleep apnea  This prevents your tongue or other tissues from blocking your throat  You can also raise the head of your bed  Follow up with your doctor or specialist as directed: You may need to have blood tests during your follow-up visits  Work with your provider to find the right breathing support equipment and settings for you  Write down your questions so you remember to ask them during your visits    © Copyright [employer] 2021 Information is for [employer] use only and may not be sold, redistributed or otherwise used for commercial purposes  All illustrations and images included in CareNotes® are the copyrighted property of A D A M , Inc  or Edgardo Sol  The above information is an  only  It is not intended as medical advice for individual conditions or treatments  Talk to your doctor, nurse or pharmacist before following any medical regimen to see if it is safe and effective for you

## 2022-01-31 NOTE — ASSESSMENT & PLAN NOTE
BMI 34 72
Rule out underlying RAIMUNDO as detailed above
Untreated RAIMUNDO is risk for uncontrolled hypertension
· The patient has high pretest probability for obstructive sleep apnea given body mass index of 34 72, snoring, excessive daytime sleepiness, history of periodic limb movements    His sleep study in 2004 did not reveal obstructive sleep apnea however the patient slept very poorly during that study and did not achieve REM sleep besides he had significant weight gain since then he had was 195 lb back then now he is up to 242 lb  · Ordered a home study test
· The patient has restless leg syndrome and periodic limb movements with a PLM index of 89 5 events per hour on his sleep study in 2004  · He continues to do well on Requip he takes anywhere between 1-4 tablets per night 0 25 mg with no reported side effects and no change in his dose over 16 years
no

## 2022-04-15 ENCOUNTER — TELEPHONE (OUTPATIENT)
Dept: UROLOGY | Facility: MEDICAL CENTER | Age: 62
End: 2022-04-15

## 2022-04-15 ENCOUNTER — OFFICE VISIT (OUTPATIENT)
Dept: UROLOGY | Facility: MEDICAL CENTER | Age: 62
End: 2022-04-15
Payer: COMMERCIAL

## 2022-04-15 VITALS
HEIGHT: 70 IN | HEART RATE: 81 BPM | DIASTOLIC BLOOD PRESSURE: 80 MMHG | WEIGHT: 242 LBS | BODY MASS INDEX: 34.65 KG/M2 | SYSTOLIC BLOOD PRESSURE: 138 MMHG

## 2022-04-15 DIAGNOSIS — N40.1 BENIGN PROSTATIC HYPERPLASIA WITH LOWER URINARY TRACT SYMPTOMS, SYMPTOM DETAILS UNSPECIFIED: Primary | ICD-10-CM

## 2022-04-15 DIAGNOSIS — Z12.5 PROSTATE CANCER SCREENING: ICD-10-CM

## 2022-04-15 LAB
POST-VOID RESIDUAL VOLUME, ML POC: 165 ML
SL AMB  POCT GLUCOSE, UA: ABNORMAL
SL AMB LEUKOCYTE ESTERASE,UA: ABNORMAL
SL AMB POCT BILIRUBIN,UA: ABNORMAL
SL AMB POCT BLOOD,UA: ABNORMAL
SL AMB POCT CLARITY,UA: CLEAR
SL AMB POCT COLOR,UA: YELLOW
SL AMB POCT KETONES,UA: ABNORMAL
SL AMB POCT NITRITE,UA: ABNORMAL
SL AMB POCT PH,UA: 7
SL AMB POCT SPECIFIC GRAVITY,UA: 1.02
SL AMB POCT URINE PROTEIN: ABNORMAL
SL AMB POCT UROBILINOGEN: 0.2

## 2022-04-15 PROCEDURE — 99203 OFFICE O/P NEW LOW 30 MIN: CPT | Performed by: NURSE PRACTITIONER

## 2022-04-15 PROCEDURE — 81003 URINALYSIS AUTO W/O SCOPE: CPT | Performed by: NURSE PRACTITIONER

## 2022-04-15 PROCEDURE — 51798 US URINE CAPACITY MEASURE: CPT | Performed by: NURSE PRACTITIONER

## 2022-04-15 NOTE — PROGRESS NOTES
4/15/2022      No chief complaint on file  Assessment and Plan    59 y o  male managed by new patient  1  Benign prostatic hyperplasia with lower urinary tract symptoms  · Bladder scan  mL  · Urine dip urine dip in office unremarkable  · Unfortunately, medical records from previous urologist not accessible for review  · Schedule cystoscopy/TRUS  · Will attempt to obtain medical records from prior urologist    2  Routine prostate cancer screening  · PSA and JUANPABLO due 06/2022    History of Present Illness  Ky Bae is a 59 y o  male here for follow up evaluation of  urinary symptoms secondary to benign prostatic hyperplasia  Patient is here to transfer care from 2 previous urologists, Dr Anitra Napoles  Patient reports approximately 20 years ago developing acute urinary tension was noted to have bladder neck contracture and had surgical repair including TURP  Patient reports doing well postoperatively but then began to develop worsening lower urinary tract symptoms and Uro levels performed 03/2021  After Chambers catheter was removed postoperatively patient developed clot retention he was admitted to the hospital and managed on continues bladder irrigation x3 days  At that point he was then taken to the operating room for clot evacuation and fulguration of bleeding  He has been managed since then on finasteride, tamsulosin and tadalafil  He reports that he is very happy with his urinary flow but reports significant urinary hesitancy in the ability to initiate his urinary stream   He also reports an intermittent stream when he delays the urge to urinate for an extended period of time  He reports getting up 1 time at night to urinate  Patient reports recently having a cystoscopy performed was told he has enlarge median lobe  Patient reports drinking between 6 and 8 glasses of water per day and decaffeinated green tea  He also has occasional intake of Gatorade/power 8    He denies the use of artificial sweeteners  He denies a family history of prostate cancer  Patient also reports a remote history of nephrolithiasis    Review of Systems   Constitutional: Negative for chills and fever  Respiratory: Negative for cough and shortness of breath  Cardiovascular: Negative for chest pain  Gastrointestinal: Negative for abdominal distention, abdominal pain, blood in stool, nausea and vomiting  Genitourinary: Negative for difficulty urinating, dysuria, enuresis, flank pain, frequency, hematuria and urgency  Urinary hesitancy, intermittent stream   Skin: Negative for rash         AUA SYMPTOM SCORE      Most Recent Value   AUA SYMPTOM SCORE    How often have you had a sensation of not emptying your bladder completely after you finished urinating? 2 (P)     How often have you had to urinate again less than two hours after you finished urinating? 2 (P)     How often have you found you stopped and started again several times when you urinate? 1 (P)     How often have you found it difficult to postpone urination? 1 (P)     How often have you had a weak urinary stream? 2 (P)     How often have you had to push or strain to begin urination? 2 (P)     How many times did you most typically get up to urinate from the time you went to bed at night until the time you got up in the morning? 5 (P)     Quality of Life: If you were to spend the rest of your life with your urinary condition just the way it is now, how would you feel about that? 5 (P)     AUA SYMPTOM SCORE 15 (P)              Past Medical History  Past Medical History:   Diagnosis Date    Eczema     Enlarged prostate     Environmental allergies     Hypertension     RLS (restless legs syndrome)        Past Social History  Past Surgical History:   Procedure Laterality Date    COLONOSCOPY      HEMORROIDECTOMY      KIDNEY STONE SURGERY      NASAL SEPTUM SURGERY      IN CYSTOURETHRO W/IMPLANT N/A 3/18/2021    Procedure: CYSTOSCOPY WITH INSERTION UROLIFT;  Surgeon: Hung Banda DO;  Location: [facility];  Service: Urology    OH CYSTOURETHROSCOPY W/IRRIG & EVAC CLOTS N/A 3/25/2021    Procedure: CYSTOSCOPY COMPLICATED EVACUATION OF CLOTS; FULGERATION OF BLADDERNECK, PROSTATIC URETHRA;  Surgeon: Hung Banda DO;  Location[facility];  Service: Urology    TONSILLECTOMY      URETERAL EXPLORATION      stricture    UVULOPALATOPHARYNGOPLASTY      VARICOSE VEIN SURGERY      VASCULAR SURGERY       Social History     Tobacco Use   Smoking Status Former Smoker    Types: Cigars    Quit date: 2013    Years since quittin 0   Smokeless Tobacco Former User       Past Family History  Family History   Problem Relation Age of Onset    Stroke Mother     Lung cancer Father     Cancer Father     Other Maternal Grandmother         Cardiac    Diabetes Paternal Grandmother     Other Paternal Grandfather         Cardiac       Past Social history  Social History     Socioeconomic History    Marital status: /Civil Union     Spouse name: Not on file    Number of children: Not on file    Years of education: Not on file    Highest education level: Not on file   Occupational History    Not on file   Tobacco Use    Smoking status: Former Smoker     Types: Cigars     Quit date: 2013     Years since quittin 0    Smokeless tobacco: Former User   Vaping Use    Vaping Use: Not on file   Substance and Sexual Activity    Alcohol use:  Yes    Drug use: No    Sexual activity: Not Currently   Other Topics Concern    Not on file   Social History Narrative    Not on file     Social Determinants of Health     Financial Resource Strain: Not on file   Food Insecurity: Not on file   Transportation Needs: Not on file   Physical Activity: Not on file   Stress: Not on file   Social Connections: Not on file   Intimate Partner Violence: Not on file   Housing Stability: Not on file       Current Medications  Current Outpatient Medications Medication Sig Dispense Refill    aspirin (ECOTRIN LOW STRENGTH) 81 mg EC tablet Take 81 mg by mouth daily      atorvastatin (LIPITOR) 10 mg tablet Take 10 mg by mouth daily      fexofenadine (ALLEGRA) 30 MG/5ML suspension Take 30 mg by mouth      finasteride (PROSCAR) 5 mg tablet Take 5 mg by mouth daily      halobetasol (ULTRAVATE) 0 05 % cream APPLY TWICE A DAY  5    lisinopril (ZESTRIL) 10 mg tablet Take 10 mg by mouth daily      melatonin 1 mg Take 0 5 mg by mouth        mometasone (ELOCON) 0 1 % lotion One drop affected ear canal daily at bedtime when necessary itching 60 mL 0    mometasone (NASONEX) 50 mcg/act nasal spray SPRAY 1 SPRAY INTO EACH NOSTRIL TWICE A DAY 51 g 3    NON FORMULARY        rOPINIRole (REQUIP) 0 25 mg tablet Take 1 tablet (0 25 mg total) by mouth 4 (four) times a day 360 tablet 3    tadalafil (CIALIS) 5 MG tablet Take 5 mg by mouth daily      tamsulosin (FLOMAX) 0 4 mg Take 0 4 mg by mouth daily with dinner      betamethasone dipropionate (DIPROSONE) 0 05 % cream APPLY EVERY DAY TO FOOT (Patient not taking: Reported on 4/15/2022)  5    clobetasol (TEMOVATE) 0 05 % external solution Apply topically (Patient not taking: Reported on 4/15/2022 )      clotrimazole-betamethasone (LOTRISONE) 1-0 05 % cream APPLY TWICE A DAY (Patient not taking: Reported on 4/15/2022)  5    EPINEPHrine (EPIPEN) 0 3 mg/0 3 mL SOAJ Inject as directed (Patient not taking: Reported on 4/15/2022 )      EPINEPHrine (EPIPEN) 0 3 mg/0 3 mL SOAJ Inject 0 3 mL (0 3 mg total) into a muscle once for 1 dose 0 6 mL 0    triamcinolone (KENALOG) 0 1 % cream APPLY TWICE A DAY  5    Tuberculin-Allergy Syringes (B-D ALLERGY SYRINGE 1CC/28G) 28G X 1/2" 1 ML MISC 3 syringes Parra every 3 weeks 50 each 1     No current facility-administered medications for this visit         Allergies  Allergies   Allergen Reactions    Food Color Orange [Yellow Dye - Food Allergy] Hives     orange flavored metamucil    Latex Itching     Band aides    Other Other (See Comments)     Seasonal rag weed dust mities    Psyllium Other (See Comments)     Orange Flavored    Telithromycin Other (See Comments)     BP dropped very low    Adhesive  [Medical Tape] Rash         The following portions of the patient's history were reviewed and updated as appropriate: allergies, current medications, past medical history, past social history, past surgical history and problem list       Vitals  Vitals:    04/15/22 0857   BP: 138/80   Pulse: 81   Weight: 110 kg (242 lb)   Height: 5' 10" (1 778 m)           Physical Exam  Physical Exam  Vitals reviewed  Constitutional:       General: He is not in acute distress  Appearance: Normal appearance  He is normal weight  HENT:      Head: Normocephalic  Pulmonary:      Effort: No respiratory distress  Breath sounds: Normal breath sounds  Skin:     General: Skin is warm and dry  Neurological:      General: No focal deficit present  Mental Status: He is alert and oriented to person, place, and time  Psychiatric:         Mood and Affect: Mood normal          Behavior: Behavior normal        Results  No results found for this or any previous visit (from the past 1 hour(s)) ]  Lab Results   Component Value Date    PSA 0 5 12/12/2013     Lab Results   Component Value Date    GLUCOSE 119 09/29/2015    CALCIUM 8 9 03/26/2021     09/29/2015    K 4 0 03/26/2021    CO2 21 03/26/2021     03/26/2021    BUN 12 03/26/2021    CREATININE 0 97 03/26/2021     Lab Results   Component Value Date    WBC 12 06 (H) 03/26/2021    HGB 15 0 03/26/2021    HCT 45 0 03/26/2021    MCV 96 03/26/2021     03/26/2021           Orders  Orders Placed This Encounter   Procedures    Cystoscopy     Standing Status:   Future     Standing Expiration Date:   10/15/2022    PSA, Total Screen     This is a patient instruction: This test is non-fasting  Please drink two glasses of water morning of bloodwork  Standing Status:   Future     Standing Expiration Date:   10/15/2023    POCT urine dip auto non-scope    POCT Measure PVR       NIKKO Cortes

## 2022-04-15 NOTE — PATIENT INSTRUCTIONS
Enlarged Prostate (BPH)   WHAT YOU NEED TO KNOW:   An enlarged prostate (BPH) is a common condition in older adults  BPH develops because the number of prostate cells increases (hyperplasia) or the cells get bigger (hypertrophy)  The prostate wraps around the urethra  An enlarged prostate can press on the urethra  This may cause problems with storing urine or emptying your bladder completely  DISCHARGE INSTRUCTIONS:   Call your doctor or urologist if:   · You see blood in your urine  · You are not able to urinate  · Your bladder feels very full and painful  · You have new or worsening symptoms  · You have a fever  · You have questions or concerns about your condition or care  Medicines:   · Medicines  may be used to relax the muscles in your prostate and bladder  This may help you urinate more easily  You may also need medicine that helps shrink, or slow the growth of your prostate  · Take your medicine as directed  Contact your healthcare provider if you think your medicine is not helping or if you have side effects  Tell him or her if you are allergic to any medicine  Keep a list of the medicines, vitamins, and herbs you take  Include the amounts, and when and why you take them  Bring the list or the pill bottles to follow-up visits  Carry your medicine list with you in case of an emergency  What you can do to manage my symptoms:   · Urinate on a regular schedule  This will train your bladder to hold urine longer  A larger amount of urine may make it easier to urinate  · Drink less liquid during the day  Do not have liquid for several hours before you go to bed at night  Do not drink large amounts of any liquid at one time  · Limit alcohol and caffeine  These can irritate your bladder and make your symptoms worse  · Eat less salt  Salt can cause fluid buildup and make it harder to urinate  Examples of salty foods are chips, cured meats, and canned soups   Do not use table salt     · Elevate your legs if you have swelling  Elevate (raise) your legs above the level of your heart  This can relieve swelling caused by fluid buildup  You may not have to get up in the night to urinate  · Exercise regularly  Exercise can help improve your symptoms  Ask your healthcare provider what a healthy weight for you is  Aim to get at least 30 minutes of exercise on most days of the week  Follow up with your doctor or urologist as directed:  Write down your questions so you remember to ask them during your visits  © Copyright [address] 2022 Information is for End User's use only and may not be sold, redistributed or otherwise used for commercial purposes  All illustrations and images included in CareNotes® are the copyrighted property of A D A M , Inc  or [employer]  The above information is an  only  It is not intended as medical advice for individual conditions or treatments  Talk to your doctor, nurse or pharmacist before following any medical regimen to see if it is safe and effective for you  Cystoscopy   AMBULATORY CARE:   A cystoscopy  is a procedure to look inside of your urethra and bladder using a cystoscope  A cystoscope is a small tube with a light and magnifying camera on the end  The procedure is used to diagnose and treat conditions of the bladder, urethra, and prostate  The procedure is also done to remove stones or blood clots from the urethra or bladder  Your healthcare provider may do other tests, such as ureteroscopy, during a cystoscopy  Prepare for your cystoscopy: You may need to stop smoking several days before your procedure, if you are having general anesthesia  Tell your healthcare provider what medicines you take  Your healthcare provider will tell you what medicines to take and not to take on the day of your procedure   You may need to stop taking medicines such as anticoagulants, aspirin, and ibuprofen several days before your procedure  He may tell you stop eating after midnight the night before your procedure  You may be asked to drink a large amount of liquids before your procedure  Make plans for someone to drive you home after your procedure  During your cystoscopy:   · You may be given general anesthesia to keep you asleep and pain free during your procedure  Your healthcare provider may give you anesthesia in your spine  With spinal anesthesia the lower part of your body will be numb  You will not feel pain during your procedure  Your healthcare provider may instead use local anesthesia that is put into your urethra and bladder  You will not feel pain, but you may be able to feel some pressure during your procedure  With local anesthesia, you may feel burning or need to urinate when the cystoscope is put in and removed  · You will be placed on your back and your feet may be placed in stirrups  The cystoscope will be will be placed through your urethra and into your bladder  The urologist will look at the walls of your urethra as the scope goes through to your bladder  Your bladder may be filled with an irrigation liquid to help your urologist see inside of your bladder more clearly   Special tools may used to remove tissue or stones  Your urologist may use a special tool to stop bleeding in your bladder  If there are blood clots in your bladder, your healthcare provider will inject an irrigation fluid into your bladder  Then he will use suction to remove the fluid and blood clots  After a cystoscopy:  After you are fully awake, you will go home  After your cystoscopy, it is normal to have pink-colored urine  It is also normal to have an increased need to urinate  You may have burning when you urinate  If you had general anesthesia, it may take at least 24 hours before you feel like your usual self  Risks of a cystoscopy: You may bleed more than expected or develop an infection   Swelling caused by the cystoscopy may cause a blockage or slow urine flow  Seek care immediately if:   · Your urine turns from pink to red, or you have clots in your urine  · You cannot urinate and your bladder feels full  · Your pain or burning becomes worse or lasts longer than 2 days  Contact your healthcare provider or urologist if:   · Your urine stays pink for longer than 3 days  · Your pain or burning becomes worse  · Your skin is itchy, swollen, or has a new rash  · You have a fever and chills  · You have questions or concerns about your condition or care  After your cystoscopy: It is normal to have pink-colored urine  It is also normal to have an increased need to urinate and burning when you urinate  If you had general anesthesia, it may take at least 24 hours before you feel like your usual self  · Drink at least 3 to 4 glasses of water daily for 2 days after your procedure  Avoid acidic juices such as orange juice and lemonade  Water can help prevent blood clots from forming  It can also help decrease the amount of acid in your urine that can cause burning  · Sit in a warm tub of water  Warm baths relieve pain and bladder spasms  · Do not have sex  until your healthcare provider tells you it is okay  Sex may increase your risk for a urinary tract infection  Medicines: You may  be given any of the following:  · Antibiotics  help treat or prevent a bacterial infection  · Acetaminophen  decreases pain and fever  It is available without a doctor's order  Ask how much to take and how often to take it  Follow directions  Read the labels of all other medicines you are using to see if they also contain acetaminophen, or ask your doctor or pharmacist  Acetaminophen can cause liver damage if not taken correctly  Do not use more than 4 grams (4,000 milligrams) total of acetaminophen in one day  · Take your medicine as directed    Contact your healthcare provider if you think your medicine is not helping or if you have side effects  Tell him or her if you are allergic to any medicine  Keep a list of the medicines, vitamins, and herbs you take  Include the amounts, and when and why you take them  Bring the list or the pill bottles to follow-up visits  Carry your medicine list with you in case of an emergency  Follow up with your healthcare provider as directed: You may need to have another cystoscopy  Write down your questions so you remember to ask them during your visits  © 2017 [address] Information is for End User's use only and may not be sold, redistributed or otherwise used for commercial purposes  All illustrations and images included in CareNotes® are the copyrighted property of A D A M , Inc  or D[employer]  The above information is an  only  It is not intended as medical advice for individual conditions or treatments  Talk to your doctor, nurse or pharmacist before following any medical regimen to see if it is safe and effective for you

## 2022-04-15 NOTE — TELEPHONE ENCOUNTER
Records were requested from Dr Efren Ortega already  ----- Message from Ira Eubanks RN sent at 4/15/2022  3:01 PM EDT -----  Regarding: FW: PSA test   Can you please get records from Dr Duran Channel office  Thanks   ----- Message -----  From: NIKKO Ma  Sent: 4/15/2022   2:58 PM EDT  To: Ira Eubanks RN  Subject: FW: PSA test                                     Are we able to get those results? Unfortunately, this is a new patient with transfer of care and his records from his prior urologist have not made it to the office before his visit today    ----- Message -----  From: Ira Eubanks RN  Sent: 4/15/2022   2:29 PM EDT  To: NIKKO Ma  Subject: FW: PSA test                                     Do you need this information? ??  ----- Message -----  From: Cheyenne Evangelista  Sent: 4/15/2022   1:26 PM EDT  To: [facility]  Subject: PSA test                                         My last psa blood test was 6/23/2021  Ordered by Dr Efren Ortega and completed by [address] [place] [address]  Hopefully you'll get those records soon  Good meeting you today and thank you for your help   Shawnee Cardona

## 2022-04-22 ENCOUNTER — PROCEDURE VISIT (OUTPATIENT)
Dept: UROLOGY | Facility: MEDICAL CENTER | Age: 62
End: 2022-04-22
Payer: COMMERCIAL

## 2022-04-22 ENCOUNTER — NURSE TRIAGE (OUTPATIENT)
Dept: OTHER | Facility: OTHER | Age: 62
End: 2022-04-22

## 2022-04-22 ENCOUNTER — TELEPHONE (OUTPATIENT)
Dept: UROLOGY | Facility: MEDICAL CENTER | Age: 62
End: 2022-04-22

## 2022-04-22 VITALS
HEART RATE: 85 BPM | WEIGHT: 238 LBS | HEIGHT: 70 IN | DIASTOLIC BLOOD PRESSURE: 88 MMHG | BODY MASS INDEX: 34.07 KG/M2 | SYSTOLIC BLOOD PRESSURE: 158 MMHG

## 2022-04-22 DIAGNOSIS — N40.1 BENIGN PROSTATIC HYPERPLASIA WITH LOWER URINARY TRACT SYMPTOMS, SYMPTOM DETAILS UNSPECIFIED: Primary | ICD-10-CM

## 2022-04-22 LAB — POST-VOID RESIDUAL VOLUME, ML POC: 315 ML

## 2022-04-22 PROCEDURE — 51798 US URINE CAPACITY MEASURE: CPT

## 2022-04-22 NOTE — TELEPHONE ENCOUNTER
Reason for Disposition   Unable to urinate (or only a few drops) > 4 hours and bladder feels very full (e g , palpable bladder or strong urge to urinate)    Answer Assessment - Initial Assessment Questions  1  SYMPTOM: "What's the main symptom you're concerned about?" (e g , frequency, incontinence)      Unable to urinate; starting to build and feels like pressure; hasn't gone since 11PM last night  2  ONSET: "When did the symptoms start?"      Last night;   3  PAIN: "Is there any pain?" If Yes, ask: "How bad is it?" (Scale: 1-10; mild, moderate, severe)      N/A  4  CAUSE: "What do you think is causing the symptoms?"      N/A  5  OTHER SYMPTOMS: "Do you have any other symptoms?" (e g , fever, flank pain, blood in urine, pain with urination)      Fever, denies blood in urine    Patient stated after a while he was able to go on his own; last time it was after urolift surgery  Patient stated he has been having problems going, but has been able to go      Protocols used: URINARY SYMPTOMS-ADULT-OH

## 2022-04-22 NOTE — TELEPHONE ENCOUNTER
Regarding: Trouble urinating  ----- Message from James Hanna sent at 4/22/2022  9:56 AM EDT -----  "I'm having problems urinating  I wasn't able to go until 11pm last night, and I haven't gone since then   What should I do?"

## 2022-04-22 NOTE — PROGRESS NOTES
4/22/2022  Janine Garcia is a 59 y o  male  [id number]    Diagnosis:  Chief Complaint     BPH w/lower urinary tract symptoms          Patient presents for follow up post void residual s/p stating he was unable to void last evening between 11 pm and 11 am   He stated he then voided "a good amount" plus voiding 3 more times  He was unable to void in the office prior to PVR  Pt is  managed by Dr Queen Locus:    FU for cysto and TRUS with Dr Ponce Welch on 4/26/22      Assessment:      Vitals:    04/22/22 1308   BP: 158/88   Pulse: 85   Weight: 108 kg (238 lb)   Height: 5' 10" (1 778 m)     Patient unable to void in the office  Post void residual measured  to be 315 mL  Consulted with Dr Brent Almanza who stated pt can be given the option to have dickey placed today or he will need to go to ED [facility] and over the weekend if unable to void  Pt is already taking two 0 4 mgs of tamsulosin daily  Pt stated he will "take his chances" and declined to have dickey placed today      Recent Results (from the past 6 hour(s))   POCT Measure PVR    Collection Time: 04/22/22  1:29 PM   Result Value Ref Range    POST-VOID RESIDUAL VOLUME, ML  mL         Barbara Doan RN

## 2022-04-22 NOTE — TELEPHONE ENCOUNTER
Patient called in stating that he hasn't been able to urinate since 11PM last night; felt like he emptied bladder well last night but has been trying to urinate since 4am without any results  Patient does feel pressure in his bladder and like he needs to urinate, but is unable to

## 2022-04-22 NOTE — TELEPHONE ENCOUNTER
Call placed to patient and spoke with him  Pt will be coming to the office for 1pm with the nurse for PVR  Pt also offered an appointment for 4- at 1pm for procedure  Pt did confirm these appointments

## 2022-04-22 NOTE — TELEPHONE ENCOUNTER
Returned called to patient  Patient states he was finally able to void a few minutes ago  Patient felt it was a good void  Last night had difficulty voiding which started around 10pm      Patient states he has been hydrating  State that urine was dark bryan in color  Advised patient that we would contact him this morning, he will probably need to come in for pvr       Message sent to site clinical and provider

## 2022-04-26 ENCOUNTER — PROCEDURE VISIT (OUTPATIENT)
Dept: UROLOGY | Facility: MEDICAL CENTER | Age: 62
End: 2022-04-26
Payer: COMMERCIAL

## 2022-04-26 VITALS
HEIGHT: 70 IN | SYSTOLIC BLOOD PRESSURE: 132 MMHG | HEART RATE: 85 BPM | WEIGHT: 236.8 LBS | DIASTOLIC BLOOD PRESSURE: 82 MMHG | BODY MASS INDEX: 33.9 KG/M2

## 2022-04-26 DIAGNOSIS — N13.8 BPH WITH URINARY OBSTRUCTION: Primary | ICD-10-CM

## 2022-04-26 DIAGNOSIS — N99.111 POSTPROCEDURAL BULBOUS URETHRAL STRICTURE: ICD-10-CM

## 2022-04-26 DIAGNOSIS — N40.1 BPH WITH URINARY OBSTRUCTION: Primary | ICD-10-CM

## 2022-04-26 PROCEDURE — 52000 CYSTOURETHROSCOPY: CPT | Performed by: UROLOGY

## 2022-04-26 PROCEDURE — 99213 OFFICE O/P EST LOW 20 MIN: CPT | Performed by: UROLOGY

## 2022-04-26 RX ORDER — CEFUROXIME AXETIL 250 MG/1
250 TABLET ORAL EVERY 12 HOURS SCHEDULED
Qty: 6 TABLET | Refills: 0 | Status: SHIPPED | OUTPATIENT
Start: 2022-04-26 | End: 2022-04-29

## 2022-04-26 NOTE — PROGRESS NOTES
NOTE: IF PATIENT NEEDS VILLEGAS CATHETER, [address] [place] [address] SHOULD GO IN PROPERLY  HISTORY:    Follow-up on BPH  See prior hospital records regarding Uro lift performed by another urologist in March 2021  Severe hematuria requiring clot evacuation one week later  Since that time he has had very difficult time starting his flow  When he does get going, which can take a long time, the flow is improved from the Uro lift  However, has trouble emptying, dribbling towards the end, nocturia times 1-3  Also surgery for urethral stricture in the past as well  ASSESSMENT / PLAN:    Cysto today shows bulbar urethral stricture with  false passage,  making it somewhat difficult to get the scope into the true lumen and then through the prostate  Prostate is quite enlarged, with significant median lobe as well  There is no appearance of any opening up of the prostatic lumen from the Uro lift  Options discussed  I have offered patient TURP, with careful instructions to say that  he should have less hesitancy and improved flow and better emptying  However, he has had a very complicated course during the past year, and that could compromise eventual result of this next procedure  If he were to have trouble urinating after today's scope exam, a coude catheter should be used  We will schedule TURP after thorough informed consent discussion, and he agrees  The following portions of the patient's history were reviewed and updated as appropriate: allergies, current medications, past family history, past medical history, past social history, past surgical history and problem list     Review of Systems   All other systems reviewed and are negative  Objective:     Physical Exam  Constitutional:       General: He is not in acute distress  Appearance: Normal appearance  He is well-developed  He is not diaphoretic  HENT:      Head: Normocephalic and atraumatic     Eyes:      General: No scleral icterus  Pulmonary:      Effort: Pulmonary effort is normal    Skin:     Coloration: Skin is not pale  Neurological:      Mental Status: He is alert and oriented to person, place, and time  Psychiatric:         Behavior: Behavior normal          Thought Content: Thought content normal          Judgment: Judgment normal                Cystoscopy     Date/Time 4/26/2022 2:05 PM     Performed by  Arianne Cook MD     Authorized by Arianne Cook MD          Procedure Details:  Procedure type: cystoscopy    Patient tolerance: Patient tolerated the procedure well with no immediate complications    Additional Procedure Details:      Patient presents for cystoscopy  I have discussed the reasons for doing the test, and the potential risks and complications  Patient expressed understanding, and signed informed consent document  The patient was carefully  positioned supine on the examining table  Sterile preparation was performed on the urethra  Xylocaine jelly was instilled and left  Indwelling for the procedure  The 13 [place] flexible cystoscope was passed with the following findings:      Urethra:  Tight bulbar stricture with false passage, possibly from procedure last month at the 6 o'clock position  Scope goes through with some manipulation  Prostate:  lateral lobes very large, large median lobe as well, obstructing                   Bladder:  Severe trabeculation, no lesions, tumor, or stones  Residual urine:  250 mL    Patient tolerated the procedure well and was escorted from the examining table      0   Lab Value Date/Time    PSA 0 5 12/12/2013 1546   ]  BUN   Date Value Ref Range Status   03/26/2021 12 5 - 25 mg/dL Final   09/29/2015 15 5 - 25 mg/dL Final     Creatinine   Date Value Ref Range Status   03/26/2021 0 97 0 60 - 1 30 mg/dL Final     Comment:     Standardized to IDMS reference method   09/29/2015 0 99 0 60 - 1 30 mg/dL Final     Comment:     Standardized to IDMS reference method No components found for: CBC      Patient Active Problem List   Diagnosis    Restless leg syndrome    Snoring    Class 2 obesity due to excess calories without serious comorbidity with body mass index (BMI) of 35 0 to 35 9 in adult    BPH (benign prostatic hyperplasia)    HTN (hypertension)    Hematuria    RAIMUNDO (obstructive sleep apnea)        Diagnoses and all orders for this visit:    BPH with urinary obstruction  -     cefuroxime (CEFTIN) 250 mg tablet; Take 1 tablet (250 mg total) by mouth every 12 (twelve) hours for 3 days    Postprocedural bulbous urethral stricture           Patient ID: Janine Garcia is a 59 y o  male        Current Outpatient Medications:     aspirin (ECOTRIN LOW STRENGTH) 81 mg EC tablet, Take 81 mg by mouth daily, Disp: , Rfl:     atorvastatin (LIPITOR) 10 mg tablet, Take 10 mg by mouth daily, Disp: , Rfl:     betamethasone dipropionate (DIPROSONE) 0 05 % cream, APPLY EVERY DAY TO FOOT, Disp: , Rfl: 5    clobetasol (TEMOVATE) 0 05 % external solution, Apply topically  , Disp: , Rfl:     clotrimazole-betamethasone (LOTRISONE) 1-0 05 % cream, APPLY TWICE A DAY, Disp: , Rfl: 5    EPINEPHrine (EPIPEN) 0 3 mg/0 3 mL SOAJ, Inject as directed  , Disp: , Rfl:     fexofenadine (ALLEGRA) 30 MG/5ML suspension, Take 30 mg by mouth, Disp: , Rfl:     finasteride (PROSCAR) 5 mg tablet, Take 5 mg by mouth daily, Disp: , Rfl:     halobetasol (ULTRAVATE) 0 05 % cream, APPLY TWICE A DAY, Disp: , Rfl: 5    lisinopril (ZESTRIL) 10 mg tablet, Take 10 mg by mouth daily, Disp: , Rfl:     melatonin 1 mg, Take 0 5 mg by mouth  , Disp: , Rfl:     mometasone (ELOCON) 0 1 % lotion, One drop affected ear canal daily at bedtime when necessary itching, Disp: 60 mL, Rfl: 0    mometasone (NASONEX) 50 mcg/act nasal spray, SPRAY 1 SPRAY INTO EACH NOSTRIL TWICE A DAY, Disp: 51 g, Rfl: 3    NON FORMULARY,  , Disp: , Rfl:     rOPINIRole (REQUIP) 0 25 mg tablet, Take 1 tablet (0 25 mg total) by mouth 4 (four) times a day, Disp: 360 tablet, Rfl: 3    tadalafil (CIALIS) 5 MG tablet, Take 5 mg by mouth daily, Disp: , Rfl:     tamsulosin (FLOMAX) 0 4 mg, Take 0 4 mg by mouth daily with dinner Pt states he is taking 2 tablets with dinner , Disp: , Rfl:     triamcinolone (KENALOG) 0 1 % cream, APPLY TWICE A DAY, Disp: , Rfl: 5    Tuberculin-Allergy Syringes (B-D ALLERGY SYRINGE 1CC/28G) 28G X 1/2" 1 ML MISC, 3 syringes Parra every 3 weeks, Disp: 50 each, Rfl: 1    cefuroxime (CEFTIN) 250 mg tablet, Take 1 tablet (250 mg total) by mouth every 12 (twelve) hours for 3 days, Disp: 6 tablet, Rfl: 0    EPINEPHrine (EPIPEN) 0 3 mg/0 3 mL SOAJ, Inject 0 3 mL (0 3 mg total) into a muscle once for 1 dose, Disp: 0 6 mL, Rfl: 0    Past Medical History:   Diagnosis Date    Eczema     Enlarged prostate     Environmental allergies     Hypertension     RLS (restless legs syndrome)        Past Surgical History:   Procedure Laterality Date    COLONOSCOPY      HEMORROIDECTOMY      KIDNEY STONE SURGERY      NASAL SEPTUM SURGERY      IL CYSTOURETHRO W/IMPLANT N/A 3/18/2021    Procedure: CYSTOSCOPY WITH INSERTION UROLIFT;  Surgeon: Sam Lundberg DO;  Location: [facility];  Service: Urology    IL CYSTOURETHROSCOPY W/IRRIG & EVAC CLOTS N/A 3/25/2021    Procedure: CYSTOSCOPY COMPLICATED EVACUATION OF CLOTS; FULGERATION OF BLADDERNECK, PROSTATIC URETHRA;  Surgeon: Sam Lundberg DO;  Location[facility];  Service: Urology    TONSILLECTOMY      URETERAL EXPLORATION      stricture    UVULOPALATOPHARYNGOPLASTY      [address]      VASCULAR SURGERY         Social History

## 2022-04-27 ENCOUNTER — TELEPHONE (OUTPATIENT)
Dept: UROLOGY | Facility: MEDICAL CENTER | Age: 62
End: 2022-04-27

## 2022-04-27 NOTE — TELEPHONE ENCOUNTER
I spoke to the patient and scheduled his  TURP for 5/17/2022 at [facility] with Dr Kurtis Michael     -instructions given verbally and Emailed  -CBC, CMP, T&S, Urine C&S and EKG  2 weeks prior  -patient will stop his Aspirin 7 days prior  -[facility] - on auth tracker 4/27/2022

## 2022-04-27 NOTE — TELEPHONE ENCOUNTER
Clerical, patient will need a 4 week PO with  AP  None available at [facility] office, please call patient to arrange

## 2022-05-02 ENCOUNTER — TELEPHONE (OUTPATIENT)
Dept: UROLOGY | Facility: MEDICAL CENTER | Age: 62
End: 2022-05-02

## 2022-05-02 ENCOUNTER — CLINICAL SUPPORT (OUTPATIENT)
Dept: URGENT CARE | Facility: MEDICAL CENTER | Age: 62
End: 2022-05-02
Attending: UROLOGY
Payer: COMMERCIAL

## 2022-05-02 DIAGNOSIS — N40.1 BPH WITH URINARY OBSTRUCTION: ICD-10-CM

## 2022-05-02 DIAGNOSIS — N13.8 BPH WITH URINARY OBSTRUCTION: ICD-10-CM

## 2022-05-02 LAB
ATRIAL RATE: 68 BPM
P AXIS: 49 DEGREES
PR INTERVAL: 146 MS
QRS AXIS: 47 DEGREES
QRSD INTERVAL: 80 MS
QT INTERVAL: 402 MS
QTC INTERVAL: 427 MS
T WAVE AXIS: 52 DEGREES
VENTRICULAR RATE: 68 BPM

## 2022-05-02 PROCEDURE — 93005 ELECTROCARDIOGRAM TRACING: CPT

## 2022-05-02 PROCEDURE — 93010 ELECTROCARDIOGRAM REPORT: CPT | Performed by: INTERNAL MEDICINE

## 2022-05-02 NOTE — PROGRESS NOTES
Sarah Bhatti had walk-in EKG completed on 05/02/22 at 11:50 AM by Nilay Bradley    Done by Jorge Luis Brennan

## 2022-05-02 NOTE — TELEPHONE ENCOUNTER
Pts care is managed by the [facility]  Pts was last seen 4/26/22    Patients wife calling asking if pt may get his COVID booster this week ? Pt is scheduled for surgery with urology 5/17/22  Pts wife is immunocompromised and is getting her booster today      Patient wife states they did call PCP and PCP stated she would not comment on whether or not pt should get COVID vaccine booster prior to surgery    pcp has directed pt to speak directly with his urologist     please advise thank you

## 2022-05-06 ENCOUNTER — TELEPHONE (OUTPATIENT)
Dept: UROLOGY | Facility: MEDICAL CENTER | Age: 62
End: 2022-05-06

## 2022-05-09 ENCOUNTER — TELEPHONE (OUTPATIENT)
Dept: OTHER | Facility: OTHER | Age: 62
End: 2022-05-09

## 2022-05-09 ENCOUNTER — ANESTHESIA EVENT (OUTPATIENT)
Dept: PERIOP | Facility: HOSPITAL | Age: 62
End: 2022-05-09
Payer: COMMERCIAL

## 2022-05-09 RX ORDER — MULTIVITAMIN WITH IRON
TABLET ORAL DAILY
COMMUNITY

## 2022-05-09 RX ORDER — ROPINIROLE 0.25 MG/1
0.25 TABLET, FILM COATED ORAL
COMMUNITY
End: 2022-06-14 | Stop reason: SDUPTHER

## 2022-05-09 RX ORDER — POLYETHYLENE GLYCOL 3350 17 G/17G
17 POWDER, FOR SOLUTION ORAL DAILY
COMMUNITY

## 2022-05-09 RX ORDER — FEXOFENADINE HCL 180 MG/1
180 TABLET ORAL DAILY
COMMUNITY

## 2022-05-09 NOTE — TELEPHONE ENCOUNTER
Patient called in requesting a dickey catheter be inserted on 5/13 or 5/16 prior to the patient's procedure on 5/17/22  Patient has concerns about voiding once he becomes NPO and feels he may not be able to void the morning of the procedure  Please follow up with the patient

## 2022-05-09 NOTE — PRE-PROCEDURE INSTRUCTIONS
Pre-Surgery Instructions:   Medication Instructions    aspirin (ECOTRIN LOW STRENGTH) 81 mg EC tablet   pt has been holding x 2 weeks    atorvastatin (LIPITOR) 10 mg tablet Take this medication day of surgery if normally taken in the morning   betamethasone dipropionate (DIPROSONE) 0 05 % cream Hold day of surgery      clobetasol (TEMOVATE) 0 05 % external solution Hold day of surgery      clotrimazole-betamethasone (LOTRISONE) 1-0 05 % cream Hold day of surgery      fexofenadine (ALLEGRA) 180 MG tablet Hold day of surgery      halobetasol (ULTRAVATE) 0 05 % cream Hold day of surgery      lisinopril (ZESTRIL) 10 mg tablet Hold day of surgery      Magnesium 250 MG TABS Hold from now till after procedure unless prescribed by a Physician   melatonin 1 mg Normally takes at night   mometasone (ELOCON) 0 1 % lotion Hold day of surgery      mometasone (NASONEX) 50 mcg/act nasal spray May use day of surgery if needed      NON FORMULARY Hold from now till after procedure unless prescribed by a Physician   polyethylene glycol (MiraLax) 17 g packet Hold day of surgery      rOPINIRole (Requip) 0 25 mg tablet Normally takes at night   tadalafil (CIALIS) 5 MG tablet Hold day of surgery      tamsulosin (FLOMAX) 0 4 mg Hold day of surgery      triamcinolone (KENALOG) 0 1 % cream Hold day of surgery       Pt called back to state that he takes a probiotic daily and needs instructions  Pt instructed can take but do not take day of surgery  My Surgical Experience    The following information was developed to assist you to prepare for your operation  What do I need to do before coming to the hospital?   Arrange for a responsible person to drive you to and from the hospital    Arrange care for your children at home  Children are not allowed in the recovery areas of the hospital   Plan to wear clothing that is easy to put on and take off   If you are having shoulder surgery, wear a shirt that buttons or zippers in the front  Bathing  o Shower the evening before and the morning of your surgery with an antibacterial soap  Please refer to the Pre Op Showering Instructions for Surgery Patients Sheet   o Remove nail polish and all body piercing jewelry  o Do not shave any body part for at least 24 hours before surgery-this includes face, arms, legs and upper body  Food  o Nothing to eat or drink after midnight the night before your surgery  This includes candy and chewing gum  o Exception: If your surgery is after 12:00pm (noon), you may have clear liquids such as 7-Up®, ginger ale, apple or cranberry juice, Jell-O®, water, or clear broth until 8:00 am  o Do not drink milk or juice with pulp on the morning before surgery  o Do not drink alcohol 24 hours before surgery  Medicine  o Follow instructions you received from your surgeon about which medicines you may take on the day of surgery  o If instructed to take medicine on the morning of surgery, take pills with just a small sip of water  Call your prescribing doctor for specific infroamtion on what to do if you take insulin    What should I bring to the hospital?    Bring:  Kalli Learn or a walker, if you have them, for foot or knee surgery   A list of the daily medicines, vitamins, minerals, herbals and nutritional supplements you take  Include the dosages of medicines and the time you take them each day   Glasses, dentures or hearing aids   Minimal clothing; you will be wearing hospital sleepwear   Photo ID; required to verify your identity   If you have a Living Will or Power of , bring a copy of the documents   If you have an ostomy, bring an extra pouch and any supplies you use    Do not bring   Medicines or inhalers   Money, valuables or jewelry    What other information should I know about the day of surgery?  Notify your surgeons if you develop a cold, sore throat, cough, fever, rash or any other illness     Report to the Ambulatory Surgical/Same Day Surgery Unit   You will be instructed to stop at Registration only if you have not been pre-registered   Inform your  fi they do not stay that they will be asked by the staff to leave a phone number where they can be reached   Be available to be reached before surgery  In the event the operating room schedule changes, you may be asked to come in earlier or later than expected    *It is important to tell your doctor and others involved in your health care if you are taking or have been taking any non-prescription drugs, vitamins, minerals, herbals or other nutritional supplements   Any of these may interact with some food or medicines and cause a reaction

## 2022-05-10 NOTE — TELEPHONE ENCOUNTER
Vinicio Roe, [address] [facility] [place] [facility] 7 minutes ago (11:31 AM)         I called him, told him why a catheter is not needed      Message text

## 2022-05-16 PROCEDURE — NC001 PR NO CHARGE: Performed by: UROLOGY

## 2022-05-17 ENCOUNTER — ANESTHESIA (OUTPATIENT)
Dept: PERIOP | Facility: HOSPITAL | Age: 62
End: 2022-05-17
Payer: COMMERCIAL

## 2022-05-17 ENCOUNTER — TELEPHONE (OUTPATIENT)
Dept: OTHER | Facility: OTHER | Age: 62
End: 2022-05-17

## 2022-05-17 ENCOUNTER — HOSPITAL ENCOUNTER (OUTPATIENT)
Facility: HOSPITAL | Age: 62
Setting detail: OUTPATIENT SURGERY
Discharge: HOME | End: 2022-05-18
Attending: UROLOGY | Admitting: UROLOGY
Payer: COMMERCIAL

## 2022-05-17 DIAGNOSIS — N40.1 BPH WITH URINARY OBSTRUCTION: Primary | ICD-10-CM

## 2022-05-17 DIAGNOSIS — N13.8 BPH WITH URINARY OBSTRUCTION: Primary | ICD-10-CM

## 2022-05-17 LAB
FLUAV RNA RESP QL NAA+PROBE: NEGATIVE
FLUBV RNA RESP QL NAA+PROBE: NEGATIVE
RSV RNA RESP QL NAA+PROBE: NEGATIVE
SARS-COV-2 RNA RESP QL NAA+PROBE: NEGATIVE

## 2022-05-17 PROCEDURE — 88344 IMHCHEM/IMCYTCHM EA MLT ANTB: CPT | Performed by: PATHOLOGY

## 2022-05-17 PROCEDURE — 88305 TISSUE EXAM BY PATHOLOGIST: CPT | Performed by: PATHOLOGY

## 2022-05-17 PROCEDURE — 52601 PROSTATECTOMY (TURP): CPT | Performed by: UROLOGY

## 2022-05-17 PROCEDURE — 0241U HB NFCT DS VIR RESP RNA 4 TRGT: CPT | Performed by: UROLOGY

## 2022-05-17 RX ORDER — ROPINIROLE 0.25 MG/1
0.25 TABLET, FILM COATED ORAL
Status: DISCONTINUED | OUTPATIENT
Start: 2022-05-17 | End: 2022-05-17

## 2022-05-17 RX ORDER — FENTANYL CITRATE/PF 50 MCG/ML
25 SYRINGE (ML) INJECTION
Status: DISCONTINUED | OUTPATIENT
Start: 2022-05-17 | End: 2022-05-17 | Stop reason: HOSPADM

## 2022-05-17 RX ORDER — ONDANSETRON 2 MG/ML
4 INJECTION INTRAMUSCULAR; INTRAVENOUS ONCE AS NEEDED
Status: DISCONTINUED | OUTPATIENT
Start: 2022-05-17 | End: 2022-05-17 | Stop reason: HOSPADM

## 2022-05-17 RX ORDER — SODIUM CHLORIDE, SODIUM LACTATE, POTASSIUM CHLORIDE, CALCIUM CHLORIDE 600; 310; 30; 20 MG/100ML; MG/100ML; MG/100ML; MG/100ML
125 INJECTION, SOLUTION INTRAVENOUS CONTINUOUS
Status: DISCONTINUED | OUTPATIENT
Start: 2022-05-17 | End: 2022-05-18 | Stop reason: HOSPADM

## 2022-05-17 RX ORDER — ACETAMINOPHEN 325 MG/1
650 TABLET ORAL EVERY 6 HOURS SCHEDULED
Status: DISCONTINUED | OUTPATIENT
Start: 2022-05-17 | End: 2022-05-18 | Stop reason: HOSPADM

## 2022-05-17 RX ORDER — ROPINIROLE 0.25 MG/1
0.75 TABLET, FILM COATED ORAL
Status: DISCONTINUED | OUTPATIENT
Start: 2022-05-17 | End: 2022-05-18 | Stop reason: HOSPADM

## 2022-05-17 RX ORDER — LISINOPRIL 10 MG/1
10 TABLET ORAL DAILY
Status: COMPLETED | OUTPATIENT
Start: 2022-05-17 | End: 2022-05-18

## 2022-05-17 RX ORDER — OXYCODONE HYDROCHLORIDE AND ACETAMINOPHEN 5; 325 MG/1; MG/1
2 TABLET ORAL EVERY 4 HOURS PRN
Status: DISCONTINUED | OUTPATIENT
Start: 2022-05-17 | End: 2022-05-18 | Stop reason: HOSPADM

## 2022-05-17 RX ORDER — SORBITOL 30 G/1000ML
IRRIGANT IRRIGATION AS NEEDED
Status: DISCONTINUED | OUTPATIENT
Start: 2022-05-17 | End: 2022-05-17 | Stop reason: HOSPADM

## 2022-05-17 RX ORDER — ONDANSETRON 2 MG/ML
4 INJECTION INTRAMUSCULAR; INTRAVENOUS EVERY 6 HOURS PRN
Status: DISCONTINUED | OUTPATIENT
Start: 2022-05-17 | End: 2022-05-18 | Stop reason: HOSPADM

## 2022-05-17 RX ORDER — CEFAZOLIN SODIUM 1 G/50ML
1000 SOLUTION INTRAVENOUS EVERY 8 HOURS
Status: COMPLETED | OUTPATIENT
Start: 2022-05-17 | End: 2022-05-18

## 2022-05-17 RX ORDER — MEPERIDINE HYDROCHLORIDE 25 MG/ML
12.5 INJECTION INTRAMUSCULAR; INTRAVENOUS; SUBCUTANEOUS
Status: DISCONTINUED | OUTPATIENT
Start: 2022-05-17 | End: 2022-05-17 | Stop reason: HOSPADM

## 2022-05-17 RX ORDER — ONDANSETRON 2 MG/ML
INJECTION INTRAMUSCULAR; INTRAVENOUS AS NEEDED
Status: DISCONTINUED | OUTPATIENT
Start: 2022-05-17 | End: 2022-05-17

## 2022-05-17 RX ORDER — CEFAZOLIN SODIUM 2 G/50ML
2000 SOLUTION INTRAVENOUS ONCE
Status: COMPLETED | OUTPATIENT
Start: 2022-05-17 | End: 2022-05-17

## 2022-05-17 RX ORDER — DEXAMETHASONE SODIUM PHOSPHATE 10 MG/ML
INJECTION, SOLUTION INTRAMUSCULAR; INTRAVENOUS AS NEEDED
Status: DISCONTINUED | OUTPATIENT
Start: 2022-05-17 | End: 2022-05-17

## 2022-05-17 RX ORDER — OXYCODONE HYDROCHLORIDE AND ACETAMINOPHEN 5; 325 MG/1; MG/1
1 TABLET ORAL EVERY 4 HOURS PRN
Status: DISCONTINUED | OUTPATIENT
Start: 2022-05-17 | End: 2022-05-18 | Stop reason: HOSPADM

## 2022-05-17 RX ORDER — LIDOCAINE HYDROCHLORIDE 20 MG/ML
INJECTION, SOLUTION EPIDURAL; INFILTRATION; INTRACAUDAL; PERINEURAL AS NEEDED
Status: DISCONTINUED | OUTPATIENT
Start: 2022-05-17 | End: 2022-05-17

## 2022-05-17 RX ORDER — OXYBUTYNIN CHLORIDE 5 MG/1
5 TABLET ORAL EVERY 6 HOURS PRN
Status: DISCONTINUED | OUTPATIENT
Start: 2022-05-17 | End: 2022-05-18 | Stop reason: HOSPADM

## 2022-05-17 RX ORDER — HYDROMORPHONE HCL/PF 1 MG/ML
0.5 SYRINGE (ML) INJECTION
Status: DISCONTINUED | OUTPATIENT
Start: 2022-05-17 | End: 2022-05-17 | Stop reason: HOSPADM

## 2022-05-17 RX ORDER — PROPOFOL 10 MG/ML
INJECTION, EMULSION INTRAVENOUS AS NEEDED
Status: DISCONTINUED | OUTPATIENT
Start: 2022-05-17 | End: 2022-05-17

## 2022-05-17 RX ORDER — LANOLIN ALCOHOL/MO/W.PET/CERES
3 CREAM (GRAM) TOPICAL
Status: DISCONTINUED | OUTPATIENT
Start: 2022-05-17 | End: 2022-05-18 | Stop reason: HOSPADM

## 2022-05-17 RX ORDER — ATROPA BELLADONNA AND OPIUM 16.2; 3 MG/1; MG/1
30 SUPPOSITORY RECTAL EVERY 8 HOURS PRN
Status: DISCONTINUED | OUTPATIENT
Start: 2022-05-17 | End: 2022-05-18 | Stop reason: HOSPADM

## 2022-05-17 RX ORDER — MIDAZOLAM HYDROCHLORIDE 2 MG/2ML
INJECTION, SOLUTION INTRAMUSCULAR; INTRAVENOUS AS NEEDED
Status: DISCONTINUED | OUTPATIENT
Start: 2022-05-17 | End: 2022-05-17

## 2022-05-17 RX ORDER — MAGNESIUM HYDROXIDE/ALUMINUM HYDROXICE/SIMETHICONE 120; 1200; 1200 MG/30ML; MG/30ML; MG/30ML
30 SUSPENSION ORAL EVERY 4 HOURS PRN
Status: DISCONTINUED | OUTPATIENT
Start: 2022-05-17 | End: 2022-05-18 | Stop reason: HOSPADM

## 2022-05-17 RX ORDER — LISINOPRIL 10 MG/1
10 TABLET ORAL DAILY
Status: DISCONTINUED | OUTPATIENT
Start: 2022-05-18 | End: 2022-05-17

## 2022-05-17 RX ORDER — FENTANYL CITRATE 50 UG/ML
INJECTION, SOLUTION INTRAMUSCULAR; INTRAVENOUS AS NEEDED
Status: DISCONTINUED | OUTPATIENT
Start: 2022-05-17 | End: 2022-05-17

## 2022-05-17 RX ORDER — SODIUM CHLORIDE, SODIUM LACTATE, POTASSIUM CHLORIDE, CALCIUM CHLORIDE 600; 310; 30; 20 MG/100ML; MG/100ML; MG/100ML; MG/100ML
75 INJECTION, SOLUTION INTRAVENOUS CONTINUOUS
Status: DISCONTINUED | OUTPATIENT
Start: 2022-05-17 | End: 2022-05-18 | Stop reason: HOSPADM

## 2022-05-17 RX ADMIN — ROPINIROLE 0.75 MG: 0.25 TABLET, FILM COATED ORAL at 21:54

## 2022-05-17 RX ADMIN — PROPOFOL 200 MG: 10 INJECTION, EMULSION INTRAVENOUS at 11:54

## 2022-05-17 RX ADMIN — DEXAMETHASONE SODIUM PHOSPHATE 4 MG: 10 INJECTION INTRAMUSCULAR; INTRAVENOUS at 11:54

## 2022-05-17 RX ADMIN — FENTANYL CITRATE 50 MCG: 50 INJECTION INTRAMUSCULAR; INTRAVENOUS at 12:24

## 2022-05-17 RX ADMIN — SODIUM CHLORIDE, SODIUM LACTATE, POTASSIUM CHLORIDE, AND CALCIUM CHLORIDE: .6; .31; .03; .02 INJECTION, SOLUTION INTRAVENOUS at 12:40

## 2022-05-17 RX ADMIN — SODIUM CHLORIDE, SODIUM LACTATE, POTASSIUM CHLORIDE, AND CALCIUM CHLORIDE 75 ML/HR: .6; .31; .03; .02 INJECTION, SOLUTION INTRAVENOUS at 20:56

## 2022-05-17 RX ADMIN — ACETAMINOPHEN 325MG 650 MG: 325 TABLET ORAL at 23:12

## 2022-05-17 RX ADMIN — LISINOPRIL 10 MG: 10 TABLET ORAL at 17:42

## 2022-05-17 RX ADMIN — CEFAZOLIN SODIUM 1000 MG: 1 SOLUTION INTRAVENOUS at 20:55

## 2022-05-17 RX ADMIN — MELATONIN 3 MG: at 23:45

## 2022-05-17 RX ADMIN — LIDOCAINE HYDROCHLORIDE 80 MG: 20 INJECTION, SOLUTION EPIDURAL; INFILTRATION; INTRACAUDAL; PERINEURAL at 11:54

## 2022-05-17 RX ADMIN — ONDANSETRON 4 MG: 2 INJECTION INTRAMUSCULAR; INTRAVENOUS at 11:54

## 2022-05-17 RX ADMIN — ACETAMINOPHEN 325MG 650 MG: 325 TABLET ORAL at 18:43

## 2022-05-17 RX ADMIN — ALUMINUM HYDROXIDE, MAGNESIUM HYDROXIDE, AND SIMETHICONE 30 ML: 200; 200; 20 SUSPENSION ORAL at 23:45

## 2022-05-17 RX ADMIN — FENTANYL CITRATE 50 MCG: 50 INJECTION INTRAMUSCULAR; INTRAVENOUS at 12:05

## 2022-05-17 RX ADMIN — SODIUM CHLORIDE, SODIUM LACTATE, POTASSIUM CHLORIDE, AND CALCIUM CHLORIDE 75 ML/HR: .6; .31; .03; .02 INJECTION, SOLUTION INTRAVENOUS at 18:45

## 2022-05-17 RX ADMIN — SODIUM CHLORIDE, SODIUM LACTATE, POTASSIUM CHLORIDE, AND CALCIUM CHLORIDE: .6; .31; .03; .02 INJECTION, SOLUTION INTRAVENOUS at 11:44

## 2022-05-17 RX ADMIN — MIDAZOLAM 2 MG: 1 INJECTION INTRAMUSCULAR; INTRAVENOUS at 11:52

## 2022-05-17 RX ADMIN — CEFAZOLIN SODIUM 2000 MG: 2 SOLUTION INTRAVENOUS at 11:52

## 2022-05-17 NOTE — ANESTHESIA PREPROCEDURE EVALUATION
Procedure:  TURP (N/A Urethra)    Relevant Problems   CARDIO   (+) HTN (hypertension)      /RENAL   (+) BPH (benign prostatic hyperplasia)      PULMONARY   (+) RAIMUNDO (obstructive sleep apnea)      Other   (+) Restless leg syndrome        Physical Exam    Airway    Mallampati score: III  TM Distance: >3 FB  Neck ROM: full     Dental   No notable dental hx     Cardiovascular  Rhythm: regular, Rate: normal, Cardiovascular exam normal    Pulmonary  Pulmonary exam normal Breath sounds clear to auscultation,     Other Findings        Anesthesia Plan  ASA Score- 2     Anesthesia Type- general with ASA Monitors  Additional Monitors:   Airway Plan:           Plan Factors-Exercise tolerance (METS): >4 METS  Chart reviewed  Patient summary reviewed  Patient is not a current smoker  Patient did not smoke on day of surgery  Induction- intravenous  Postoperative Plan-     Informed Consent- Anesthetic plan and risks discussed with patient and spouse

## 2022-05-17 NOTE — INTERVAL H&P NOTE
H&P reviewed  After examining the patient I find no changes in the patients condition since the H&P had been written      Vitals:    05/17/22 1005   BP: 157/81   Pulse: 96   Resp: 14   Temp: 99 5 °F (37 5 °C)   SpO2: 94%

## 2022-05-17 NOTE — ANESTHESIA POSTPROCEDURE EVALUATION
Post-Op Assessment Note    CV Status:  Stable  Pain Score: 1    Pain management: adequate     Mental Status:  Alert and awake   Hydration Status:  Euvolemic   PONV Controlled:  Controlled   Airway Patency:  Patent      Post Op Vitals Reviewed: Yes      Staff: Anesthesiologist         No complications documented      BP      Temp      Pulse     Resp      SpO2      /77   Pulse 82   Temp 98 4 °F (36 9 °C)   Resp 14   Ht 5' 10" (1 778 m)   Wt 109 kg (239 lb 6 7 oz)   SpO2 93%   BMI 34 35 kg/m²

## 2022-05-18 ENCOUNTER — TELEPHONE (OUTPATIENT)
Dept: UROLOGY | Facility: HOSPITAL | Age: 62
End: 2022-05-18

## 2022-05-18 ENCOUNTER — TELEPHONE (OUTPATIENT)
Dept: OTHER | Facility: HOSPITAL | Age: 62
End: 2022-05-18

## 2022-05-18 VITALS
HEIGHT: 70 IN | HEART RATE: 68 BPM | DIASTOLIC BLOOD PRESSURE: 71 MMHG | WEIGHT: 239.42 LBS | RESPIRATION RATE: 18 BRPM | SYSTOLIC BLOOD PRESSURE: 146 MMHG | BODY MASS INDEX: 34.28 KG/M2 | OXYGEN SATURATION: 93 % | TEMPERATURE: 97.4 F

## 2022-05-18 PROCEDURE — 99024 POSTOP FOLLOW-UP VISIT: CPT

## 2022-05-18 PROCEDURE — NC001 PR NO CHARGE

## 2022-05-18 RX ORDER — CEFUROXIME AXETIL 500 MG/1
500 TABLET ORAL EVERY 12 HOURS SCHEDULED
Qty: 10 TABLET | Refills: 0 | Status: SHIPPED | OUTPATIENT
Start: 2022-05-18 | End: 2022-05-23

## 2022-05-18 RX ORDER — DOCUSATE SODIUM 100 MG/1
100 CAPSULE, LIQUID FILLED ORAL 2 TIMES DAILY PRN
Qty: 30 CAPSULE | Refills: 0 | Status: SHIPPED | OUTPATIENT
Start: 2022-05-18 | End: 2022-06-17

## 2022-05-18 RX ORDER — HYDROCODONE BITARTRATE AND ACETAMINOPHEN 5; 325 MG/1; MG/1
1 TABLET ORAL EVERY 6 HOURS PRN
Qty: 10 TABLET | Refills: 0 | Status: SHIPPED | OUTPATIENT
Start: 2022-05-18 | End: 2022-05-28

## 2022-05-18 RX ADMIN — ACETAMINOPHEN 325MG 650 MG: 325 TABLET ORAL at 05:41

## 2022-05-18 RX ADMIN — ACETAMINOPHEN 325MG 650 MG: 325 TABLET ORAL at 13:36

## 2022-05-18 RX ADMIN — LISINOPRIL 10 MG: 10 TABLET ORAL at 08:15

## 2022-05-18 RX ADMIN — CEFAZOLIN SODIUM 1000 MG: 1 SOLUTION INTRAVENOUS at 04:01

## 2022-05-18 NOTE — PLAN OF CARE
Problem: Potential for Falls  Goal: Patient will remain free of falls  Description: INTERVENTIONS:  - Educate patient/family on patient safety including physical limitations  - Instruct patient to call for assistance with activity   - Consult OT/PT to assist with strengthening/mobility   - Keep Call bell within reach  - Keep bed low and locked with side rails adjusted as appropriate  - Keep care items and personal belongings within reach  - Initiate and maintain comfort rounds  - Make Fall Risk Sign visible to staff  - Offer Toileting every 2 Hours, in advance of need  - Initiate/Maintain bed alarm  - Obtain necessary fall risk management equipment: walker  - Apply yellow socks and bracelet for high fall risk patients  - Consider moving patient to room near nurses station  Outcome: Progressing     Problem: PAIN - ADULT  Goal: Verbalizes/displays adequate comfort level or baseline comfort level  Description: Interventions:  - Encourage patient to monitor pain and request assistance  - Assess pain using appropriate pain scale  - Administer analgesics based on type and severity of pain and evaluate response  - Implement non-pharmacological measures as appropriate and evaluate response  - Consider cultural and social influences on pain and pain management  - Notify physician/advanced practitioner if interventions unsuccessful or patient reports new pain  Outcome: Progressing     Problem: Knowledge Deficit  Goal: Patient/family/caregiver demonstrates understanding of disease process, treatment plan, medications, and discharge instructions  Description: Complete learning assessment and assess knowledge base    Interventions:  - Provide teaching at level of understanding  - Provide teaching via preferred learning methods  Outcome: Progressing

## 2022-05-18 NOTE — PLAN OF CARE
Problem: Potential for Falls  Goal: Patient will remain free of falls  Description: INTERVENTIONS:  - Educate patient/family on patient safety including physical limitations  - Instruct patient to call for assistance with activity   - Consult OT/PT to assist with strengthening/mobility   - Keep Call bell within reach  - Keep bed low and locked with side rails adjusted as appropriate  - Keep care items and personal belongings within reach  - Initiate and maintain comfort rounds  - Make Fall Risk Sign visible to staff  - Offer Toileting every 2 Hours, in advance of need  - Initiate/Maintainalarm  - Obtain necessary fall risk management equipment:   - Apply yellow socks and bracelet for high fall risk patients  - Consider moving patient to room near nurses station  Outcome: Adequate for Discharge

## 2022-05-18 NOTE — TELEPHONE ENCOUNTER
Patient discharged today s/p TURP by Dr Lindsey Roberson   Will need to be contacted to have dickey catheter removed on Friday 5/20 with void trial

## 2022-05-18 NOTE — QUICK NOTE
Chambers catheter draining clear pink tinged urine after CBI was clamped around noon today  There are no clots within the drainage tubing or drainage back  He offers no complaints and is cleared for discharge at this time       Norah Torres

## 2022-05-18 NOTE — TELEPHONE ENCOUNTER
Pt still admitted  He is scheduled with the nurse on 5- at 9am for dickey catheter removal  Once patient is discharged, the office will contact pt to confirm this appointment

## 2022-05-18 NOTE — OP NOTE
OPERATIVE REPORT  PATIENT NAME: Dany Wolfe    :  1960  MRN: [id number]  Pt Location: [facility] ROOM 06    SURGERY DATE: 2022    Surgeon(s) and Role:     Elmira Acevedo MD - Primary    Preop Diagnosis:  BPH with urinary obstruction [N40 1, N13 8]    Post-Op Diagnosis Codes:     * BPH with urinary obstruction [N40 1, N13 8]    Procedure(s) (LRB):  TURP (N/A)    Specimen(s):  ID Type Source Tests Collected by Time Destination   1 : Prostate Tissue Tissue Prostate TISSUE EXAM Dallas Ontiveros MD 2022 12:35 PM        Estimated Blood Loss:   Minimal    Drains:  Urethral Catheter Non-latex; Three way 24 Fr  (Active)   Number of days: 419       Urethral Catheter Three way;Non-latex 24 Fr  (Active)   Reasons to continue Urinary Catheter  Post-operative urological requirements 22 1001   Site Assessment Clean;Skin intact 22 1001   Chambers Care Done 22 1001   Collection Container Standard drainage bag 22 1001   Securement Method Tape 22 1001   Number of days: 1       Continuous Bladder Irrigation Three-way (Active)   Site Assessment Clean;Skin intact 22 1001   Securement Method Tape 22 1001   Rate Moderate 22 1001   Irrigant Normal saline 22 1001   CBI Irrigation Intake (mL) 1250 mL 22 1201   CBI Chambers Output (mL) 1750 mL 22 1201   CBI Net Output (mL) 500 mL 22 1201   Chambers Output Appearance Clear;Bloody 22 1001   Number of days: 1       Anesthesia Type:   General/LMA    Operative Indications:  BPH with urinary obstruction [N40 1, N13 8]      Operative Findings:  Large prostate,  Uro lift clips visible and resected    Complications:   None    Procedure and Technique:      The patient was brought into the OR, properly identified and positioned on the table  General anesthesia was induced, and he was prepped using chlorhexidine solution and draped in lithotomy position        The 32 F resectoscope sheath was introduced, and inspection confirmed hyperplasia of the prostate and secondary obstructive changes in the bladder  Prostate tissue was resected circumferentially from the bladder neck out to the apex, down to intermittent capsular fibers  There were no capsular perforations noted  Pieces of tissue were evacuated using the Urovac bottle type evacuator  Several fragments of Uro lift clips were encountered, and removed with the scope  Hemostasis was obtained using electrocautery  Final inspection revealed no damage to the ureteral orifices, well resected prostate, no significant bleeding, no retained pieces of tissue, and no damage to the sphincter  The scope was removed, a 25 Fcatheter was inserted, irrigated clear, and hooked up to gravity drainage   The patient left the OR in good condition     I was present for the entire procedure    Patient Disposition:  PACU       SIGNATURE: Arianne Cook MD  DATE: May 18, 2022  TIME: 1:42 PM

## 2022-05-18 NOTE — PROGRESS NOTES
Progress Note - Urology  Ary Gardiner 1960, 59 y o  male MRN: [id number]    Unit/Bed#: E2 -01 Encounter: [id number]    1  BPH  · POD #1 for TURP by Dr Lalo Black  · Vital signs stable overnight  · Patient sitting upright in bed tolerating his breakfast, reports no significant pain, and is passing gas  · Chambers catheter is draining light pink tinged urine no clots  · Chambers catheter taken off traction and CBI running at slow rate  · Plan to clamp CBI around noon and reassess this evening  · Plan for discharge this afternoon after CBI is clamped and reassessment  If patient is able to continue tolerating his diet, his pain is controlled, and urine is free of any clots or significant hematuria he can be cleared for discharge  Subjective:  Patient is sitting up out of bed offering no complaints  He was able to tolerate his breakfast and reports passing gas  He did notice that when he was standing up at bed his urine became a little darker color, currently it is light pink in color and free of clots  HPI:  26-year-old male significant history of BPH  He had a uro lift performed by another urologist in March of 2021, 1 week after his uro lift he had severe hematuria requiring clot evacuation  Since his uro lift he has been having a very difficult time starting his flow, trouble emptying, dribbling towards the end of urination, and nocturia 1-3 times per night  He also has a surgical history for urethral stricture in the past     Review of Systems   Constitutional: Negative for activity change, chills and fever  HENT: Negative for congestion and sore throat  Respiratory: Negative for cough and shortness of breath  Cardiovascular: Negative for chest pain and leg swelling  Gastrointestinal: Negative for abdominal pain, constipation, diarrhea, nausea and vomiting  Genitourinary: Positive for hematuria   Negative for difficulty urinating, dysuria, flank pain, frequency, testicular pain and urgency  Musculoskeletal: Negative for back pain and gait problem  Skin: Negative for wound  Allergic/Immunologic: Negative for immunocompromised state  Neurological: Negative for dizziness, tremors, weakness, light-headedness, numbness and headaches  Hematological: Does not bruise/bleed easily  Objective:  Nursing Rounds:   Vitals: Blood pressure 146/71, pulse 68, temperature (!) 97 4 °F (36 3 °C), temperature source Oral, resp  rate 18, height 5' 10" (1 778 m), weight 109 kg (239 lb 6 7 oz), SpO2 93 %  ,Body mass index is 34 35 kg/m²  Physical Exam  Vitals reviewed  Constitutional:       Appearance: Normal appearance  HENT:      Head: Normocephalic and atraumatic  Nose: Nose normal       Mouth/Throat:      Mouth: Mucous membranes are dry  Pharynx: Oropharynx is clear  Eyes:      General: No scleral icterus  Extraocular Movements: Extraocular movements intact  Conjunctiva/sclera: Conjunctivae normal    Cardiovascular:      Rate and Rhythm: Normal rate and regular rhythm  Pulses: Normal pulses  Heart sounds: Normal heart sounds  Pulmonary:      Effort: Pulmonary effort is normal       Breath sounds: Normal breath sounds  Abdominal:      General: Abdomen is flat  Palpations: Abdomen is soft  Tenderness: There is no abdominal tenderness  There is no right CVA tenderness or left CVA tenderness  Hernia: No hernia is present  Genitourinary:     Penis: Normal        Testes: Normal       Comments: Chambers catheter draining to gravity light pink colored urine, no clots  CBI running at slow rate  Musculoskeletal:         General: Normal range of motion  Cervical back: Normal range of motion  No tenderness  Right lower leg: No edema  Left lower leg: No edema  Skin:     General: Skin is warm and dry  Coloration: Skin is not jaundiced or pale  Neurological:      General: No focal deficit present        Mental Status: He is alert and oriented to person, place, and time  Mental status is at baseline  Gait: Gait normal    Psychiatric:         Mood and Affect: Mood normal          Behavior: Behavior normal          Thought Content: Thought content normal          Judgment: Judgment normal          Imaging:    Imaging reviewed - both report and images personally reviewed  Labs:  No results for input(s): WBC in the last 72 hours  No results for input(s): HGB in the last 72 hours  No results for input(s): HCT in the last 72 hours  No results for input(s): CREATININE in the last 72 hours        History:    Past Medical History:   Diagnosis Date    Eczema     Enlarged prostate     Environmental allergies     Hypertension     Kidney cysts     Kidney stone     Prediabetes     RLS (restless legs syndrome)      Social History     Socioeconomic History    Marital status: /Civil Union     Spouse name: None    Number of children: None    Years of education: None    Highest education level: None   Occupational History    None   Tobacco Use    Smoking status: Former Smoker     Types: Cigars     Quit date: 2013     Years since quittin 1    Smokeless tobacco: Former User   Vaping Use    Vaping Use: Never used   Substance and Sexual Activity    Alcohol use: Yes     Comment: social    Drug use: No    Sexual activity: Not Currently   Other Topics Concern    None   Social History Narrative    None     Social Determinants of Health     Financial Resource Strain: Not on file   Food Insecurity: Not on file   Transportation Needs: Not on file   Physical Activity: Not on file   Stress: Not on file   Social Connections: Not on file   Intimate Partner Violence: Not on file   Housing Stability: Not on file     Past Surgical History:   Procedure Laterality Date    COLONOSCOPY      HEMORROIDECTOMY      KIDNEY STONE SURGERY      [address] W/IMPLANT N/A 3/18/2021    Procedure: CYSTOSCOPY WITH INSERTION UROLIFT;  Surgeon: Flori Marshall DO;  [facility];  Service: Urology    [place] CYSTOURETHROSCOPY W/IRRIG & EVAC CLOTS N/A 3/25/2021    Procedure: CYSTOSCOPY COMPLICATED EVACUATION OF CLOTS; FULGERATION OF BLADDERNECK, PROSTATIC URETHRA;  Surgeon: Flori Marshall DO;  [facility];  Service: Urology    [place] TRANSURETHRAL ELEC-SURG PROSTATECTOM N/A 5/17/2022    Procedure: TURP;  Surgeon: Leighann Cho MD;  [facility];  Service: Urology    TONSILLECTOMY      URETERAL EXPLORATION      stricture    UVULOPALATOPHARYNGOPLASTY      VARICOSE VEIN SURGERY      VASCULAR SURGERY      varicose vein removal     Family History   Problem Relation Age of Onset    Stroke Mother     Lung cancer Father     Cancer Father     Other Maternal Grandmother         Cardiac    Diabetes Paternal Grandmother     Other Paternal NIKKO Marcelo  Date: 5/18/2022 Time: 10:12 AM

## 2022-05-18 NOTE — QUICK NOTE
Patient continues do well, Chambers catheter is draining clear light pink tinged urine with CBI running and slow rate  I manually irrigated his catheter with approximately 120 cc sterile saline with return of clear yellow urine and no clots  CBI was clamped this time and will re-evaluate him later this afternoon for possible discharge      Penny Chauhan

## 2022-05-18 NOTE — DISCHARGE SUMMARY
Discharge Summary - Wilber Basilio 59 y o  male MRN: [id number]    Unit/Bed#: E2 -69 Encounter: [id number]    Admission Date: 5/17/2022     Discharge Date: 05/18/22    HPI: Wilber Basilio is a 59 y o  male who presented for TURP by Dr Lindsey Roberson  Procedure(s):  TURP  Surgeon(s):  Kiana Seaman MD  5/17/2022    Hospital Course:  Patient presented to hospital on 05/17 for a planned TURP by Dr Lindsey Roberson  Postoperatively he was brought to the medical-surgical unit for observation overnight on CBI  POD #1 he is tolerating his diet, ambulating, report no significant pain, and was passing gas  CBI was weaned off and discontinued, his urine remained light pink in color, vitals and labs were WNL  He was re-evaluated in the afternoon was deemed stable for discharge  Discharge Diagnosis: BPH (benign prostatic hyperplasia)    Condition at Discharge: good    Discharge Medications:  See after visit summary for reconciled discharge medications provided to patient and family  Patient was discharged home on home medications with the addition of Norco for severe pain, colace BID PRN for constipation, and Ceftin twice per day for 5 days  Discharge instructions/Information to patient and family:   See after visit summary for written and verbal information which has been provided to patient and family  Provisions for Follow-Up Care:  See after visit summary for information related to follow-up care and any pertinent home health orders  Disposition: Home    Planned Readmission: No    Discharge Statement   I spent 30 minutes discharging the patient  This time was spent on the day of discharge  I had direct contact with the patient on the day of discharge  Additional documentation is required if more than 30 minutes were spent on discharge       Signature:   NIKKO Marroquin  Date: 5/18/2022 Time: 2:47 PM

## 2022-05-18 NOTE — DISCHARGE INSTRUCTIONS
Transurethral Prostatectomy   AMBULATORY CARE:   What you need to know about a transurethral prostatectomy (TURP):  A TURP is surgery to remove part or all of your prostate gland  This surgery is also called transurethral resection of the prostate  How to prepare for a TURP:   Your surgeon will tell you how to prepare  You may be told not to eat or drink anything after midnight on the day of surgery  Arrange to have someone drive you home after surgery  Tell your surgeon about all the medicines you currently take  He or she will tell you if you need to stop any medicine before surgery, and when to stop  He or she will tell you which medicines to take or not take on the day of surgery  Tell your surgeon if you have heart disease or blood clotting problems  You may be given medicine to shrink the size of your prostate  You may also be given antibiotics to help prevent or treat a bacterial infection  Tell your surgeon if you had an allergic reaction to antibiotics, anesthesia, or other medicine  You may need blood and urine tests  You may also need a rectal exam to check the size of your prostate  What will happen during a TURP:   You may be given anesthesia to make you lose feeling from the waist down, or to keep you asleep during surgery  Your surgeon will insert a resectoscope through your urethra  A resectoscope is a tube with a small monitor on the end  The monitor shows your prostate on a screen during surgery  Your bladder may be filled with fluid during surgery  Heat that is produced by the resectoscope will be used to remove part, or all, of your prostate  Heat will also be used to stop bleeding in the surgery area  Fluid is used to wash away extra tissue and blood  The resectoscope will be removed from your urethra  A catheter (soft tube) will be put through your urethra and into your bladder  The catheter will be left in place to drain urine out of your body and into a bag      What to expect after a TURP:  A Chambers catheter is inserted to drain urine into a bag  Your healthcare provider will tell you how to clean around the catheter if you go home with one  You will need to clean the area 2 times a day to prevent infection  You may have feelings of urgency and difficulty controlling your urine  You may have pain when you urinate and also a small amount of blood in your urine  You may also have a problem getting an erection and keeping one  Risks of a TURP:   Your prostate, bladder, or urethra may be damaged  Your urethra or part of your bladder may grow narrow  This can make it difficult or painful to urinate  You may feel like you need to urinate often, or have trouble controlling when you urinate  You may get blood clots in your urine that can block your urethra  You may develop a urinary tract infection, or an infection in the surgery area  You may have trouble getting an erection or ejaculating  You may develop TURP syndrome, which can cause dizziness, fatigue, stomach pain, and vomiting  If you had a partial resection of the prostate, the part of your prostate that was not removed may grow too large  This can cause your signs and symptoms to return, and you may need to have surgery again  Seek care immediately if:   You have severe abdominal or back pain  You are dizzy or confused  You have abdominal pain, nausea, and vomiting  Your heartbeat is slower than usual     Call your doctor or urologist if:   You urinate little or not at all  You have a fever  You have new or more blood in your urine  You have trouble starting to urinate, or have a weak stream of urine when you urinate  You feel like you have a full bladder, even after you urinate  You often wake up during the night to urinate  You feel pain or pressure in your lower abdomen  Your urine looks cloudy, and smells bad  You have questions or concerns about your condition or care      Medicines: You may need any of the following:  Prescription pain medicine  may be given  Ask your healthcare provider how to take this medicine safely  Some prescription pain medicines contain acetaminophen  Do not take other medicines that contain acetaminophen without talking to your healthcare provider  Too much acetaminophen may cause liver damage  Prescription pain medicine may cause constipation  Ask your healthcare provider how to prevent or treat constipation  Antibiotics  prevent or fight an infection caused by bacteria  Take your medicine as directed  Contact your healthcare provider if you think your medicine is not helping or if you have side effects  Tell him or her if you are allergic to any medicine  Keep a list of the medicines, vitamins, and herbs you take  Include the amounts, and when and why you take them  Bring the list or the pill bottles to follow-up visits  Carry your medicine list with you in case of an emergency  Chambers catheter care:  Keep the bag below your waist  If the bag is too high, urine will flow back into your bladder  This can cause an infection  Do not pull on the catheter  This may cause pain and bleeding, and the catheter may come out  Do not let the catheter tubing kink or twist  A kink or twist will block the flow of urine  Bladder control:  After surgery, you may leak urine and have trouble controlling when you urinate  The following can help decrease or manage urine leakage:  Drink fluids as directed  Fluids may help your kidneys and bladder work properly  Fluids can also decrease your chance for infections  Ask your healthcare provider which fluids are best for you and how much you should drink  Do not have caffeine  Caffeine can cause problems with bladder control and increase your need to urinate  Wear a pad or adult diapers, if needed  These may help to absorb leaking urine and decrease odor  Do pelvic floor muscle exercises    Pelvic floor muscle exercises, also called Kegels, may help improve your bladder control  These exercises are done by tightening and relaxing your pelvic muscles  Ask how to do pelvic floor muscle exercises, and how often to do them  Activity:  Ask when it is okay for you to return to work and activities, or to have sex  Follow up with your surgeon or urologist as directed: You may need to return to make sure you do not have an infection, or to have your Chambers catheter removed  Write down your questions so you remember to ask them during your visits  © Copyright [employer] 2022 Information is for End User's use only and may not be sold, redistributed or otherwise used for commercial purposes  All illustrations and images included in CareNotes® are the copyrighted property of [employer]  or Edgardo Sol  The above information is an  only  It is not intended as medical advice for individual conditions or treatments  Talk to your doctor, nurse or pharmacist before following any medical regimen to see if it is safe and effective for you

## 2022-05-18 NOTE — TELEPHONE ENCOUNTER
Patient still in [address] [place] [address] Pkwy Note    Cheyenne Evangelista is a 59 y o  male s/p TURP performed 05/17/2022  Cheyenne Evangelista is a patient of Dr Madie Oviedo and is seen at the [facility] office       Patient still in patient

## 2022-05-19 NOTE — TELEPHONE ENCOUNTER
Post Op Note    Jeana Ruffin is a 59 y o  male s/p TURP performed 05/17/2022  Jeana Ruffin is a patient of Dr Nina Bryson and is seen at the [facility] office  How would you rate your pain on a scale from 1 to 10, 10 being the worst pain ever? 2  Have you had a fever? No  Have your bowel movements been regular? Yes - he also took a stool softener  If the patient has a dickey- are you comfortable caring for your dickey? Yes Is it draining urine? Yes  Do you have any other questions or concerns that I can address at this time? He is having some blood clots   Encouraged him to hydrate with water - he is scheduled tomorrow for a void trial at the [facility] office

## 2022-05-19 NOTE — TELEPHONE ENCOUNTER
Ketan Mcdermott RN         8:59 AM  Note  Post Op Note     Juan Pablo Chen is a 59 y o  male s/p TURP performed 05/17/2022  Juan Pablo Chen is a patient of Dr Rosemarie Soto and is seen at the [facility] office       How would you rate your pain on a scale from 1 to 10, 10 being the worst pain ever? 2  Have you had a fever? No  Have your bowel movements been regular? Yes - he also took a stool softener  If the patient has a dickey- are you comfortable caring for your dickey? Yes Is it draining urine? Yes  Do you have any other questions or concerns that I can address at this time? He is having some blood clots   Encouraged him to hydrate with water - he is scheduled tomorrow for a void trial at the [facility] office

## 2022-05-20 ENCOUNTER — TELEPHONE (OUTPATIENT)
Dept: UROLOGY | Facility: CLINIC | Age: 62
End: 2022-05-20

## 2022-05-20 ENCOUNTER — PROCEDURE VISIT (OUTPATIENT)
Dept: UROLOGY | Facility: MEDICAL CENTER | Age: 62
End: 2022-05-20
Payer: COMMERCIAL

## 2022-05-20 VITALS
HEIGHT: 70 IN | SYSTOLIC BLOOD PRESSURE: 148 MMHG | WEIGHT: 226 LBS | BODY MASS INDEX: 32.35 KG/M2 | DIASTOLIC BLOOD PRESSURE: 82 MMHG

## 2022-05-20 DIAGNOSIS — N13.8 BPH WITH URINARY OBSTRUCTION: Primary | ICD-10-CM

## 2022-05-20 DIAGNOSIS — N40.1 BPH WITH URINARY OBSTRUCTION: Primary | ICD-10-CM

## 2022-05-20 LAB — POST-VOID RESIDUAL VOLUME, ML POC: 589 ML

## 2022-05-20 PROCEDURE — 51798 US URINE CAPACITY MEASURE: CPT

## 2022-05-20 PROCEDURE — 51702 INSERT TEMP BLADDER CATH: CPT

## 2022-05-20 NOTE — PROGRESS NOTES
5/20/2022    Wilber Basilio  1960  [id number]    Diagnosis  Chief Complaint     Benign Prostatic Hypertrophy          Patient presents for dickey catheter removal s/p TURP managed by Dr Avani Parish  Return to the office this afternoon for PVR  Return to the office in 4-6 weeks for follow up s/p TURP  Procedure Dickey removal/voiding trial    Dickey catheter removed after deflation of an intact balloon  Patient tolerated well  Encouraged patient to hydrate well and return this afternoon for post void residual   he knows he may return early if uncomfortable and unable to urinate  Patient agrees to this plan  Pt is aware he will follow up in the [facility] office this afternoon for PVR if he is unable to urinate             Vitals:    05/20/22 0903   BP: 148/82   BP Location: Left arm   Patient Position: Sitting   Cuff Size: Standard   Weight: 103 kg (226 lb)   Height: 5' 10" (1 778 m)           Cresencio Gutiérrez RN

## 2022-05-20 NOTE — PROGRESS NOTES
Patient returns to the [facility] office this afternoon for PVR  Patient states he has not voided since dickey removal this morning  Recent Results (from the past 1 hour(s))   POCT Measure PVR    Collection Time: 05/20/22  3:06 PM   Result Value Ref Range    POST-VOID RESIDUAL VOLUME, ML  mL     Discussed with Dr Isaiah Scott  Plan to reinsert dickey catheter and follow up with Dr José Tee  Patient draped and prepped with betadine, lidocaine gel 2% instilled into urethra, 16 ft silicone dickey reinserted without issue  Bladder drained for 600 ml of tea colored urine  Dickey attached to nighttime bag  Patient tolerated procedure well

## 2022-05-20 NOTE — TELEPHONE ENCOUNTER
Patient's wife calling to confirm that they will be keeping the appointment at 3 pm today in the [address] [place] [address] location for PVR

## 2022-05-20 NOTE — TELEPHONE ENCOUNTER
Patient seen today for void trial and failed  Chambers reinserted for 600 ml urine  Will send encounter to Dr Kurtis Michael for plan of care for patient  Wife will call office on Monday

## 2022-05-23 ENCOUNTER — TELEPHONE (OUTPATIENT)
Dept: UROLOGY | Facility: MEDICAL CENTER | Age: 62
End: 2022-05-23

## 2022-05-23 NOTE — TELEPHONE ENCOUNTER
Patient calling to find out since his dickey removal was moved to 5/26 and he finished his antibiotics today, if he should be getting more prescribed         Patient requesting a call back at [phone]

## 2022-05-26 ENCOUNTER — PROCEDURE VISIT (OUTPATIENT)
Dept: UROLOGY | Facility: MEDICAL CENTER | Age: 62
End: 2022-05-26

## 2022-05-26 VITALS
BODY MASS INDEX: 32.21 KG/M2 | HEIGHT: 70 IN | WEIGHT: 225 LBS | SYSTOLIC BLOOD PRESSURE: 142 MMHG | DIASTOLIC BLOOD PRESSURE: 88 MMHG

## 2022-05-26 DIAGNOSIS — N13.8 BPH WITH URINARY OBSTRUCTION: Primary | ICD-10-CM

## 2022-05-26 DIAGNOSIS — N40.1 BPH WITH URINARY OBSTRUCTION: Primary | ICD-10-CM

## 2022-05-26 PROCEDURE — 99024 POSTOP FOLLOW-UP VISIT: CPT

## 2022-05-26 NOTE — PROGRESS NOTES
5/26/2022    Hang Duque  1960  [id number]    Diagnosis  Chief Complaint     Benign Prostatic Hypertrophy; Urinary Retention          Patient presents for dickey catheter removal s/p TURP and acute urinary retention managed by Dr Maryellen Li  Return to the office this afternoon for PVR  Return to the office on 6- with AP for post op visit  Procedure Dickey removal/voiding trial    Dickey catheter removed after deflation of an intact balloon  Patient tolerated well  Encouraged patient to hydrate well and return this afternoon for post void residual   he knows he may return early if uncomfortable and unable to urinate  Patient agrees to this plan  Pt reached out to the office via [employer] message stating that he has urinated at least twice since having the dickey catheter removed this morning  His stream is strong and he is voiding well  Pt cancelled his appointment for this afternoon with the nurse for PVR and he will contact the office with any concerns or questions he should have prior to his scheduled follow up in June  Encouraged patient to hydrate well and to contact the office if needed           Vitals:    05/26/22 0825   BP: 142/88   BP Location: Left arm   Patient Position: Sitting   Cuff Size: Standard   Weight: 102 kg (225 lb)   Height: 5' 10" (1 778 m)           Salvador Rudd RN

## 2022-05-31 ENCOUNTER — TELEPHONE (OUTPATIENT)
Dept: UROLOGY | Facility: MEDICAL CENTER | Age: 62
End: 2022-05-31

## 2022-05-31 NOTE — TELEPHONE ENCOUNTER
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

## 2022-06-14 DIAGNOSIS — G25.81 RESTLESS LEG SYNDROME: Primary | ICD-10-CM

## 2022-06-14 RX ORDER — ROPINIROLE 0.25 MG/1
TABLET, FILM COATED ORAL
Qty: 120 TABLET | Refills: 0 | Status: SHIPPED | OUTPATIENT
Start: 2022-06-14 | End: 2022-06-16 | Stop reason: SDUPTHER

## 2022-06-14 NOTE — TELEPHONE ENCOUNTER
DR Chely Truong was discontinued by another office on 5/9/2022 not sure why  I sent the patient a [employer] message to call and reschedule an appointment with you  Will you refill RX?

## 2022-06-14 NOTE — TELEPHONE ENCOUNTER
----- Message from [address] [place] [address] sent at 6/13/2022  6:01 PM EDT -----  Regarding: Requip prescription   Someone from your office put my Requip prescription in inactive status  Why? I put my sleep test on hold until I recover from my recent prostate TURP surgery  See My Chart  Please reactivate my Requip prescription   Lori Buck

## 2022-06-16 ENCOUNTER — OFFICE VISIT (OUTPATIENT)
Dept: UROLOGY | Facility: MEDICAL CENTER | Age: 62
End: 2022-06-16
Payer: COMMERCIAL

## 2022-06-16 VITALS
OXYGEN SATURATION: 96 % | HEART RATE: 77 BPM | SYSTOLIC BLOOD PRESSURE: 128 MMHG | WEIGHT: 225.4 LBS | DIASTOLIC BLOOD PRESSURE: 74 MMHG | BODY MASS INDEX: 32.27 KG/M2 | HEIGHT: 70 IN

## 2022-06-16 DIAGNOSIS — G25.81 RESTLESS LEG SYNDROME: ICD-10-CM

## 2022-06-16 DIAGNOSIS — N40.1 BPH WITH URINARY OBSTRUCTION: Primary | ICD-10-CM

## 2022-06-16 DIAGNOSIS — N13.8 BPH WITH URINARY OBSTRUCTION: Primary | ICD-10-CM

## 2022-06-16 DIAGNOSIS — C61 PROSTATE CANCER (HCC): ICD-10-CM

## 2022-06-16 LAB
POST-VOID RESIDUAL VOLUME, ML POC: 94 ML
SL AMB  POCT GLUCOSE, UA: ABNORMAL
SL AMB LEUKOCYTE ESTERASE,UA: ABNORMAL
SL AMB POCT BILIRUBIN,UA: ABNORMAL
SL AMB POCT BLOOD,UA: ABNORMAL
SL AMB POCT CLARITY,UA: ABNORMAL
SL AMB POCT COLOR,UA: YELLOW
SL AMB POCT KETONES,UA: ABNORMAL
SL AMB POCT NITRITE,UA: ABNORMAL
SL AMB POCT PH,UA: 7.5
SL AMB POCT SPECIFIC GRAVITY,UA: 1.01
SL AMB POCT URINE PROTEIN: ABNORMAL
SL AMB POCT UROBILINOGEN: 0.2

## 2022-06-16 PROCEDURE — 51798 US URINE CAPACITY MEASURE: CPT | Performed by: UROLOGY

## 2022-06-16 PROCEDURE — 99024 POSTOP FOLLOW-UP VISIT: CPT | Performed by: UROLOGY

## 2022-06-16 PROCEDURE — 81003 URINALYSIS AUTO W/O SCOPE: CPT | Performed by: UROLOGY

## 2022-06-16 RX ORDER — ROPINIROLE 0.25 MG/1
TABLET, FILM COATED ORAL
Qty: 360 TABLET | Refills: 2 | Status: SHIPPED | OUTPATIENT
Start: 2022-06-16

## 2022-06-16 NOTE — PROGRESS NOTES
Now four weeks out from TURP  This is done after failed Uro lift elsewhere  Voiding with much improved stream, although still lots of frequency, every hour or so daytime, nocturia times 2-4 depending on liquid intake  No hematuria    Also had one tiny focus of [place] six cancer on the specimen, we discussed active surveillance for that     Emptying well with PVR only 84 mL today  Encourage that this will recede over time  Follow-up 4-6 weeks for PVR    If at that time still having tremendous urgency frequency, and if it is no better, would consider short-term overactive bladder medicine    Otherwise PSA in one year

## 2022-06-16 NOTE — TELEPHONE ENCOUNTER
----- Message from [address] [place] [address] sent at 6/14/2022  6:01 PM EDT -----  Regarding: Requip prescription   I can't refill this until 6/28 and my plan covers 90 day scripts  Thank you   Vamshi Adam

## 2022-06-20 ENCOUNTER — TELEPHONE (OUTPATIENT)
Dept: UROLOGY | Facility: MEDICAL CENTER | Age: 62
End: 2022-06-20

## 2022-06-20 NOTE — TELEPHONE ENCOUNTER
----- Message from [address] [place] [address] sent at 6/20/2022  3:43 PM EDT -----  Regarding: Urine culture ordered on 6/16  Dr Ponce Welch said the urine collected on 6/16 was sent out for culture and results should be received today  Any results yet? Thank you

## 2022-06-23 NOTE — TELEPHONE ENCOUNTER
Spoke to pt and advised urine was not sent out for culture  He states he is doing better and does not think he has an infection  He will contact us with any further issues or concerns

## 2022-07-27 RX ORDER — TAMSULOSIN HYDROCHLORIDE 0.4 MG/1
CAPSULE ORAL
COMMUNITY
Start: 2020-07-27 | End: 2022-08-19 | Stop reason: SDUPTHER

## 2022-07-27 RX ORDER — ZOLPIDEM TARTRATE 5 MG/1
5 TABLET ORAL DAILY PRN
COMMUNITY
Start: 2022-07-20

## 2022-07-28 ENCOUNTER — PROCEDURE VISIT (OUTPATIENT)
Dept: UROLOGY | Facility: MEDICAL CENTER | Age: 62
End: 2022-07-28
Payer: COMMERCIAL

## 2022-07-28 ENCOUNTER — TELEPHONE (OUTPATIENT)
Dept: UROLOGY | Facility: MEDICAL CENTER | Age: 62
End: 2022-07-28

## 2022-07-28 VITALS
HEART RATE: 84 BPM | HEIGHT: 70 IN | SYSTOLIC BLOOD PRESSURE: 160 MMHG | WEIGHT: 223 LBS | BODY MASS INDEX: 31.92 KG/M2 | DIASTOLIC BLOOD PRESSURE: 88 MMHG

## 2022-07-28 DIAGNOSIS — N13.8 BPH WITH URINARY OBSTRUCTION: Primary | ICD-10-CM

## 2022-07-28 DIAGNOSIS — N40.1 BPH WITH URINARY OBSTRUCTION: Primary | ICD-10-CM

## 2022-07-28 LAB — POST-VOID RESIDUAL VOLUME, ML POC: 28 ML

## 2022-07-28 PROCEDURE — 51798 US URINE CAPACITY MEASURE: CPT

## 2022-07-28 PROCEDURE — 99024 POSTOP FOLLOW-UP VISIT: CPT

## 2022-07-28 NOTE — PROGRESS NOTES
7/28/2022  Farhan Estrada is a 59 y o  male  [id number]    Diagnosis:  Chief Complaint     BPH with urinary obstruction          Patient presents for follow up post void residual s/p TURP on 5/17/22 with Dr Maryellen Cheung:    FU as directed by Dr Palmer Tate:    Pt states he has not seen any improvement in urinary frequency and urgency since before the surgery  He states he is urinating every 2 hours and urgency is becoming problematic  He was getting up to void 2 times per night and couldn't get back to sleep so he is taking Ambien  Pt is taking tamsulosin even though Dr Dorrene Saint told him he didn't need it  According to Dr Lis Ramirez note from 6/16/22 he should continue short-term OAB med for continuing symptoms  Pt is hesitant to try this because he is afraid it will put him in retention  Told pt that Dr Dorrene Saint would be advised of this encounter and he would receive a call back with his recommendations  Vitals:    07/28/22 1309   BP: 160/88   Pulse: 84   Weight: 101 kg (223 lb)   Height: 5' 10" (1 778 m)     Patient was unable to voic in the office  Post void residual measured to be 28 mls      Recent Results (from the past 6 hour(s))   POCT Measure PVR    Collection Time: 07/28/22  3:37 PM   Result Value Ref Range    POST-VOID RESIDUAL VOLUME, ML POC 28 mL         Rohini Bueno RN

## 2022-08-14 ENCOUNTER — PATIENT MESSAGE (OUTPATIENT)
Dept: UROLOGY | Facility: MEDICAL CENTER | Age: 62
End: 2022-08-14

## 2022-08-14 DIAGNOSIS — R39.15 URGENCY OF URINATION: Primary | ICD-10-CM

## 2022-08-19 ENCOUNTER — TELEPHONE (OUTPATIENT)
Dept: UROLOGY | Facility: AMBULATORY SURGERY CENTER | Age: 62
End: 2022-08-19

## 2022-08-19 DIAGNOSIS — N40.1 BENIGN PROSTATIC HYPERPLASIA WITH LOWER URINARY TRACT SYMPTOMS, SYMPTOM DETAILS UNSPECIFIED: Primary | ICD-10-CM

## 2022-08-19 RX ORDER — TAMSULOSIN HYDROCHLORIDE 0.4 MG/1
0.8 CAPSULE ORAL
Qty: 180 CAPSULE | Refills: 3 | Status: SHIPPED | OUTPATIENT
Start: 2022-08-19

## 2022-08-19 RX ORDER — TAMSULOSIN HYDROCHLORIDE 0.4 MG/1
0.4 CAPSULE ORAL
Qty: 90 CAPSULE | Refills: 3 | Status: SHIPPED | OUTPATIENT
Start: 2022-08-19 | End: 2022-08-19 | Stop reason: CLARIF

## 2022-08-19 NOTE — TELEPHONE ENCOUNTER
Regarding: FW: tamsulosin 0 4 mg 2 per dsy 90 day supply   Please send in Tamsulosin 0 4 mg bid to [facility] pharmacy for patient       ----- Message -----  From: Danial Cooper MA  Sent: 8/19/2022   8:08 AM EDT  To: [facility]  Subject: tamsulosin 0 4 mg 2 per dsy 90 day supply        ----- Message from Deni Huang sent at 8/19/2022  8:08 AM EDT -----       ----- Message from Levi Christian to Rachel Mehta MD sent at 8/17/2022  5:43 PM -----   Any status update on my refill request? Or do I need an appointment? Som Vora      ----- Message -----       From:Juan Murray       Sent:8/14/2022  6:27 PM EDT         To: Rachel Mehta MD    Subject:tamsulosin 0 4 mg 2 per dsy 90 day supply     I need a refill for tamsulosin 0 4 mg x 2 per day  I have been trying to ween myself off but I have difficulty initiating my urine flow without it  Thank you   Som Vora

## 2022-08-19 NOTE — PATIENT COMMUNICATION
Script written wrong    CORRECTED SCRIPT was requeued and forwarded to the Advanced Practitioner covering the [facility] location for approval

## 2022-08-21 ENCOUNTER — HOSPITAL ENCOUNTER (EMERGENCY)
Facility: HOSPITAL | Age: 62
Discharge: HOME | End: 2022-08-21
Attending: EMERGENCY MEDICINE
Payer: COMMERCIAL

## 2022-08-21 ENCOUNTER — NURSE TRIAGE (OUTPATIENT)
Dept: OTHER | Facility: OTHER | Age: 62
End: 2022-08-21

## 2022-08-21 VITALS
TEMPERATURE: 97.6 F | HEIGHT: 70 IN | HEART RATE: 82 BPM | OXYGEN SATURATION: 96 % | WEIGHT: 223 LBS | BODY MASS INDEX: 31.92 KG/M2 | RESPIRATION RATE: 18 BRPM | DIASTOLIC BLOOD PRESSURE: 84 MMHG | SYSTOLIC BLOOD PRESSURE: 148 MMHG

## 2022-08-21 DIAGNOSIS — R39.198 DIFFICULTY URINATING: Primary | ICD-10-CM

## 2022-08-21 LAB
ALBUMIN SERPL BCP-MCNC: 3.8 G/DL (ref 3.5–5)
ALP SERPL-CCNC: 77 U/L (ref 46–116)
ALT SERPL W P-5'-P-CCNC: 35 U/L (ref 12–78)
ANION GAP SERPL CALCULATED.3IONS-SCNC: 6 MMOL/L (ref 4–13)
AST SERPL W P-5'-P-CCNC: 23 U/L (ref 5–45)
BACTERIA UR QL AUTO: ABNORMAL /HPF
BASOPHILS # BLD AUTO: 0.06 THOUSANDS/ΜL (ref 0–0.1)
BASOPHILS NFR BLD AUTO: 1 % (ref 0–1)
BILIRUB SERPL-MCNC: 0.84 MG/DL (ref 0.2–1)
BILIRUB UR QL STRIP: NEGATIVE
BUN SERPL-MCNC: 15 MG/DL (ref 5–25)
CALCIUM SERPL-MCNC: 8.9 MG/DL (ref 8.3–10.1)
CHLORIDE SERPL-SCNC: 101 MMOL/L (ref 96–108)
CLARITY UR: CLEAR
CO2 SERPL-SCNC: 25 MMOL/L (ref 21–32)
COLOR UR: YELLOW
CREAT SERPL-MCNC: 1.03 MG/DL (ref 0.6–1.3)
EOSINOPHIL # BLD AUTO: 0.05 THOUSAND/ΜL (ref 0–0.61)
EOSINOPHIL NFR BLD AUTO: 1 % (ref 0–6)
ERYTHROCYTE [DISTWIDTH] IN BLOOD BY AUTOMATED COUNT: 12.2 % (ref 11.6–15.1)
GFR SERPL CREATININE-BSD FRML MDRD: 76 ML/MIN/1.73SQ M
GLUCOSE SERPL-MCNC: 103 MG/DL (ref 65–140)
GLUCOSE UR STRIP-MCNC: NEGATIVE MG/DL
HCT VFR BLD AUTO: 46.9 % (ref 36.5–49.3)
HGB BLD-MCNC: 16 G/DL (ref 12–17)
HGB UR QL STRIP.AUTO: ABNORMAL
HYALINE CASTS #/AREA URNS LPF: ABNORMAL /LPF
IMM GRANULOCYTES # BLD AUTO: 0.03 THOUSAND/UL (ref 0–0.2)
IMM GRANULOCYTES NFR BLD AUTO: 0 % (ref 0–2)
KETONES UR STRIP-MCNC: ABNORMAL MG/DL
LEUKOCYTE ESTERASE UR QL STRIP: ABNORMAL
LYMPHOCYTES # BLD AUTO: 0.78 THOUSANDS/ΜL (ref 0.6–4.47)
LYMPHOCYTES NFR BLD AUTO: 10 % (ref 14–44)
MCH RBC QN AUTO: 31.9 PG (ref 26.8–34.3)
MCHC RBC AUTO-ENTMCNC: 34.1 G/DL (ref 31.4–37.4)
MCV RBC AUTO: 93 FL (ref 82–98)
MONOCYTES # BLD AUTO: 0.72 THOUSAND/ΜL (ref 0.17–1.22)
MONOCYTES NFR BLD AUTO: 9 % (ref 4–12)
MUCOUS THREADS UR QL AUTO: ABNORMAL
NEUTROPHILS # BLD AUTO: 6.3 THOUSANDS/ΜL (ref 1.85–7.62)
NEUTS SEG NFR BLD AUTO: 79 % (ref 43–75)
NITRITE UR QL STRIP: NEGATIVE
NON-SQ EPI CELLS URNS QL MICRO: ABNORMAL /HPF
NRBC BLD AUTO-RTO: 0 /100 WBCS
PH UR STRIP.AUTO: 6 [PH] (ref 4.5–8)
PLATELET # BLD AUTO: 143 THOUSANDS/UL (ref 149–390)
PMV BLD AUTO: 10.3 FL (ref 8.9–12.7)
POTASSIUM SERPL-SCNC: 3.5 MMOL/L (ref 3.5–5.3)
PROT SERPL-MCNC: 7.1 G/DL (ref 6.4–8.4)
PROT UR STRIP-MCNC: NEGATIVE MG/DL
RBC # BLD AUTO: 5.02 MILLION/UL (ref 3.88–5.62)
RBC #/AREA URNS AUTO: ABNORMAL /HPF
SODIUM SERPL-SCNC: 132 MMOL/L (ref 135–147)
SP GR UR STRIP.AUTO: 1.02 (ref 1–1.03)
UROBILINOGEN UR QL STRIP.AUTO: 0.2 E.U./DL
WBC # BLD AUTO: 7.94 THOUSAND/UL (ref 4.31–10.16)
WBC #/AREA URNS AUTO: ABNORMAL /HPF

## 2022-08-21 PROCEDURE — 36415 COLL VENOUS BLD VENIPUNCTURE: CPT | Performed by: PHYSICIAN ASSISTANT

## 2022-08-21 PROCEDURE — 85025 COMPLETE CBC W/AUTO DIFF WBC: CPT | Performed by: PHYSICIAN ASSISTANT

## 2022-08-21 PROCEDURE — 51798 US URINE CAPACITY MEASURE: CPT

## 2022-08-21 PROCEDURE — 99284 EMERGENCY DEPT VISIT MOD MDM: CPT | Performed by: PHYSICIAN ASSISTANT

## 2022-08-21 PROCEDURE — 87086 URINE CULTURE/COLONY COUNT: CPT | Performed by: PHYSICIAN ASSISTANT

## 2022-08-21 PROCEDURE — 81001 URINALYSIS AUTO W/SCOPE: CPT

## 2022-08-21 PROCEDURE — 99283 EMERGENCY DEPT VISIT LOW MDM: CPT

## 2022-08-21 PROCEDURE — 80053 COMPREHEN METABOLIC PANEL: CPT | Performed by: PHYSICIAN ASSISTANT

## 2022-08-21 RX ORDER — CEPHALEXIN 500 MG/1
500 CAPSULE ORAL EVERY 6 HOURS SCHEDULED
Qty: 28 CAPSULE | Refills: 0 | Status: SHIPPED | OUTPATIENT
Start: 2022-08-21 | End: 2022-08-28

## 2022-08-21 NOTE — ED PROVIDER NOTES
History  Chief Complaint   Patient presents with    Painful Urination     Patient states he had trouble urinating last night since 10 PM  Patient urinated in waiting room without difficulty  Hx TURP     Patient presents to the ER for evaluation of difficulty urinating  The patient states that he has a history of BPH, prostate cancer and overactive bladder  Patient states that he usually urinates around every 2 hours however states that last night he could not urinate after 10pm  The patient states that he tried all his "usual tricks" during the night to urinate but was unable to  States that he took 2 flomax tabs this morning and did urinate in the ER waiting room however states that he feels like he did not completely empty his bladder  The patient does note that he had a TURP procedure done in May of this 2022  Patient denies any fevers, dizziness, chest pain, shortness of breath, abdominal pain, back pain, nausea, vomiting, diarrhea, dysuria, testicular pain or any other concerning symptoms  Prior to Admission Medications   Prescriptions Last Dose Informant Patient Reported? Taking?    EPINEPHrine (EPIPEN) 0 3 mg/0 3 mL SOAJ  Self Yes No   Sig: Inject as directed     Magnesium 250 MG TABS   Yes No   Sig: Take by mouth in the morning   Patient not taking: No sig reported   NON FORMULARY  Self Yes No   Si (two) times a day   Patient not taking: Reported on 2022   Probiotic Product (PROBIOTIC PO)   Yes No   Sig: Take by mouth in the morning   Tuberculin-Allergy Syringes (B-D ALLERGY SYRINGE 1CC/28G) 28G X 1/2" 1 ML MISC  Self No No   Sig: 3 syringes Parra every 3 weeks   aspirin (ECOTRIN LOW STRENGTH) 81 mg EC tablet   Yes No   Sig: Take 81 mg by mouth daily   atorvastatin (LIPITOR) 10 mg tablet  Self Yes No   Sig: Take 10 mg by mouth daily   betamethasone dipropionate (DIPROSONE) 0 05 % cream  Self Yes No   Sig: APPLY EVERY DAY TO FOOT   clobetasol (TEMOVATE) 0 05 % external solution   Yes No   Sig: Apply topically     Patient not taking: No sig reported   clotrimazole-betamethasone (LOTRISONE) 1-0 05 % cream  Self Yes No   Sig: APPLY TWICE A DAY   docusate sodium (COLACE) 100 mg capsule   No No   Sig: Take 1 capsule (100 mg total) by mouth as needed in the morning and 1 capsule (100 mg total) as needed in the evening for constipation     Patient not taking: No sig reported   fexofenadine (ALLEGRA) 180 MG tablet   Yes No   Sig: Take 180 mg by mouth daily   Patient not taking: Reported on 7/28/2022   halobetasol (ULTRAVATE) 0 05 % cream  Self Yes No   Sig: APPLY TWICE A DAY   lisinopril (ZESTRIL) 10 mg tablet  Self Yes No   Sig: Take 10 mg by mouth daily   melatonin 1 mg  Self Yes No   Sig: Take 0 5 mg by mouth daily at bedtime     mometasone (ELOCON) 0 1 % lotion  Self No No   Sig: One drop affected ear canal daily at bedtime when necessary itching   mometasone (NASONEX) 50 mcg/act nasal spray  Self No No   Sig: SPRAY 1 SPRAY INTO EACH NOSTRIL TWICE A DAY   polyethylene glycol (MIRALAX) 17 g packet   Yes No   Sig: Take 17 g by mouth daily   rOPINIRole (REQUIP) 0 25 mg tablet   No No   Sig: Takes at bedtime between 1-4  0 25 mg tablets tablets per night   tadalafil (CIALIS) 5 MG tablet  Self Yes No   Sig: Take 5 mg by mouth daily   tamsulosin (FLOMAX) 0 4 mg   No No   Sig: Take 2 capsules (0 8 mg total) by mouth daily with dinner   triamcinolone (KENALOG) 0 1 % cream  Self Yes No   Sig: APPLY TWICE A DAY   zolpidem (AMBIEN) 5 mg tablet   Yes No   Sig: Take 5 mg by mouth daily as needed      Facility-Administered Medications: None       Past Medical History:   Diagnosis Date    Eczema     Enlarged prostate     Environmental allergies     Hypertension     Kidney cysts     Kidney stone     Prediabetes     RLS (restless legs syndrome)        Past Surgical History:   Procedure Laterality Date    COLONOSCOPY      HEMORROIDECTOMY      KIDNEY STONE SURGERY      NASAL SEPTUM SURGERY      OH CYSTOURETHRO W/IMPLANT N/A 3/18/2021    Procedure: CYSTOSCOPY WITH INSERTION UROLIFT;  Surgeon: Justin Rizo DO;  [facility];  Service: Urology    CA CYSTOURETHROSCOPY W/IRRIG & EVAC CLOTS N/A 3/25/2021    Procedure: CYSTOSCOPY COMPLICATED EVACUATION OF CLOTS; FULGERATION OF BLADDERNECK, PROSTATIC URETHRA;  Surgeon: Justin Rizo DO;  [facility];  Service: Urology    CA TRANSURETHRAL ELEC-SURG PROSTATECTOM N/A 2022    Procedure: TURP;  Surgeon: Neris Ann MD;  [facility];  Service: Urology    TONSILLECTOMY      URETERAL EXPLORATION      stricture    UVULOPALATOPHARYNGOPLASTY      VARICOSE VEIN SURGERY      VASCULAR SURGERY      varicose vein removal       Family History   Problem Relation Age of Onset    Stroke Mother     Lung cancer Father     Cancer Father     Other Maternal Grandmother         Cardiac    Diabetes Paternal Grandmother     Other Paternal Grandfather         Cardiac     I have reviewed and agree with the history as documented  E-Cigarette/Vaping    E-Cigarette Use Never User      E-Cigarette/Vaping Substances    Nicotine No     THC No     CBD No     Flavoring No     Other No     Unknown No      Social History     Tobacco Use    Smoking status: Former Smoker     Types: Cigars     Quit date: 2013     Years since quittin 3    Smokeless tobacco: Former User   Vaping Use    Vaping Use: Never used   Substance Use Topics    Alcohol use: Yes     Comment: social    Drug use: No       Review of Systems   Constitutional: Negative for chills and fever  HENT: Negative for congestion and sore throat  Respiratory: Negative for cough and shortness of breath  Cardiovascular: Negative for chest pain  Gastrointestinal: Negative for abdominal pain, diarrhea, nausea and vomiting  Genitourinary: Positive for difficulty urinating  Negative for dysuria  Musculoskeletal: Negative for back pain  Skin: Negative for rash  Neurological: Negative for headaches  All other systems reviewed and are negative  Physical Exam  Physical Exam  Constitutional:       General: He is not in acute distress  Appearance: He is well-developed  HENT:      Head: Normocephalic and atraumatic  Nose: Nose normal    Eyes:      Conjunctiva/sclera: Conjunctivae normal    Cardiovascular:      Rate and Rhythm: Normal rate  Pulmonary:      Effort: Pulmonary effort is normal    Abdominal:      General: Bowel sounds are normal       Palpations: Abdomen is soft  Tenderness: There is no abdominal tenderness  There is no right CVA tenderness, left CVA tenderness or guarding  Musculoskeletal:         General: Normal range of motion  Cervical back: Normal range of motion  Skin:     General: Skin is warm  Capillary Refill: Capillary refill takes less than 2 seconds  Neurological:      Mental Status: He is alert and oriented to person, place, and time  Vital Signs  ED Triage Vitals [08/21/22 1056]   Temperature Pulse Respirations Blood Pressure SpO2   97 6 °F (36 4 °C) 82 18 148/84 96 %      Temp Source Heart Rate Source Patient Position - Orthostatic VS BP Location FiO2 (%)   Oral Monitor Sitting Left arm --      Pain Score       No Pain           Vitals:    08/21/22 1056   BP: 148/84   Pulse: 82   Patient Position - Orthostatic VS: Sitting         Visual Acuity      ED Medications  Medications - No data to display    Diagnostic Studies  Results Reviewed     Procedure Component Value Units Date/Time    Urine Microscopic [[id number]]  (Abnormal) Collected: 08/21/22 1339    Lab Status: Final result Specimen: Urine Updated: 08/21/22 1440     RBC, UA 1-2 /hpf      WBC, UA 4-10 /hpf      Epithelial Cells None Seen /hpf      Bacteria, UA None Seen /hpf      MUCUS THREADS Occasional     Hyaline Casts, UA 0-3 /lpf     Urine culture [[id number]] Collected: 08/21/22 1340    Lab Status:  In process Specimen: Urine, Clean Catch Updated: 08/21/22 1346    Urine Macroscopic, POC [[id number]]  (Abnormal) Collected: 08/21/22 1339    Lab Status: Final result Specimen: Urine Updated: 08/21/22 1340     Color, UA Yellow     Clarity, UA Clear     pH, UA 6 0     Leukocytes, UA Trace     Nitrite, UA Negative     Protein, UA Negative mg/dl      Glucose, UA Negative mg/dl      Ketones, UA 15 (1+) mg/dl      Urobilinogen, UA 0 2 E U /dl      Bilirubin, UA Negative     Occult Blood, UA Moderate     Specific [place], UA 1 020    Narrative:      CLINITEK RESULT    Comprehensive metabolic panel [[id number]]  (Abnormal) Collected: 08/21/22 1206    Lab Status: Final result Specimen: Blood from Arm, Left Updated: 08/21/22 1231     Sodium 132 mmol/L      Potassium 3 5 mmol/L      Chloride 101 mmol/L      CO2 25 mmol/L      ANION GAP 6 mmol/L      BUN 15 mg/dL      Creatinine 1 03 mg/dL      Glucose 103 mg/dL      Calcium 8 9 mg/dL      AST 23 U/L      ALT 35 U/L      Alkaline Phosphatase 77 U/L      Total Protein 7 1 g/dL      Albumin 3 8 g/dL      Total Bilirubin 0 84 mg/dL      eGFR 76 ml/min/1 73sq m     Narrative:      [place] guidelines for Chronic Kidney Disease (CKD):     Stage 1 with normal or high GFR (GFR > 90 mL/min/1 73 square meters)    Stage 2 Mild CKD (GFR = 60-89 mL/min/1 73 square meters)    Stage 3A Moderate CKD (GFR = 45-59 mL/min/1 73 square meters)    Stage 3B Moderate CKD (GFR = 30-44 mL/min/1 73 square meters)    Stage 4 Severe CKD (GFR = 15-29 mL/min/1 73 square meters)    Stage 5 End Stage CKD (GFR <15 mL/min/1 73 square meters)  Note: GFR calculation is accurate only with a steady state creatinine    CBC and differential [[id number]]  (Abnormal) Collected: 08/21/22 1206    Lab Status: Final result Specimen: Blood from Arm, Left Updated: 08/21/22 1227     WBC 7 94 Thousand/uL      RBC 5 02 Million/uL      Hemoglobin 16 0 g/dL      Hematocrit 46 9 %      MCV 93 fL      MCH 31 9 pg      MCHC 34 1 g/dL      RDW 12 2 %      MPV 10 3 fL      Platelets 422 Thousands/uL      nRBC 0 /100 WBCs      Neutrophils Relative 79 %      Immat GRANS % 0 %      Lymphocytes Relative 10 %      Monocytes Relative 9 %      Eosinophils Relative 1 %      Basophils Relative 1 %      Neutrophils Absolute 6 30 Thousands/µL      Immature Grans Absolute 0 03 Thousand/uL      Lymphocytes Absolute 0 78 Thousands/µL      Monocytes Absolute 0 72 Thousand/µL      Eosinophils Absolute 0 05 Thousand/µL      Basophils Absolute 0 06 Thousands/µL                  No orders to display              Procedures  Procedures         ED Course  ED Course as of 08/21/22 1528   Sun Aug 21, 2022   1117 Blood Pressure: 148/84   1117 Temperature: 97 6 °F (36 4 °C)   1117 Pulse: 82   1117 Respirations: 18   1117 SpO2: 96 %   1156 Bladder scan, 297   1239 WBC: 7 94   1239 Hemoglobin: 16 0   1239 Platelet Count(!): 566   1239 Sodium(!): 132   1240 Creatinine: 1 03  Baseline 0 9-1 1   1240 Per urology, Jason Oliveros, "Work him up for infection with labs, UA & culture  Unless renal function is impaired no need fir imagong  But if off then non contrast CT  Thanks  Send him with dickey will arrange follow up "   1336 Patient currently declining dickey catheter  Per nurse, patient did void 370cc  Urine sent  Will bladder scan again   1418 105ml repeat bladder scan   1421 Called lab regarding urine micro  States will run in now   [phone] Urology updated on results and patients refusal of dickey  Okay for discharge per urology                                             MDM     Patient well appearing in no acute distress in the ER  Patient did have 2 episodes urination in the ER  After the 1st bladder scan, patient urinated over 300 cc  Patient refused Dickey catheter  States that he is urinating better  Bladder scanned after patient urinated again, 105 cc  Reviewed lab results, bladder scanning results, and patient's refusal of Dickey catheter with Urology    States that patient may be discharged and follow-up in office  Discussed with patient that he must follow up closely with his urologist, recommended calling 1st thing tomorrow morning to schedule an appointment  Discussed close follow-up with his primary care doctor for repeat lab work as well as his sodium was mildly low  Patient voiced understanding and agreement, request discharge at this time  Discussed with patient he must return to the ER immediately with any new or worsening symptoms  Patient and his wife voiced understanding and agreement to plan  Patient in no acute distress throughout ER stay  Vitals stable and reassuring  Patient stable for discharge at this time  Reviewed plan with patient/family  Reviewed red flag symptoms and strict return instructions  Patient/family voiced understanding and agreement to plan  Patient/family had opportunity to ask questions and all questions were answered at bedside  Disposition  Final diagnoses:   Difficulty urinating     Time reflects when diagnosis was documented in both MDM as applicable and the Disposition within this note     Time User Action Codes Description Comment    8/21/2022  2:46 PM Vicente Meli Add [[id number] [id number]] Difficulty urinating       ED Disposition     ED Disposition   Discharge    Condition   Stable    Date/Time   Sun Aug 21, 2022  2:48 PM    Comment   Damion Phan discharge to home/self care                 Follow-up Information     Follow up With Specialties Details Why Contact Info Additional [address] Emergency Department Emergency Medicine  If symptoms worsen [place] [address] [id number]  [address] Emergency Department, [address], [address] Lynda Aguilar MD Urology Schedule an appointment as soon as possible for a visit in 1 day  [address]  [place] [address] [address]  [phone] [address] [place] [address],  Family Medicine  Follow up for repeat blood work [address]   [phone]             Discharge Medication List as of 8/21/2022  2:48 PM      CONTINUE these medications which have NOT CHANGED    Details   aspirin (ECOTRIN LOW STRENGTH) 81 mg EC tablet Take 81 mg by mouth daily, Historical Med      atorvastatin (LIPITOR) 10 mg tablet Take 10 mg by mouth daily, Historical Med      betamethasone dipropionate (DIPROSONE) 0 05 % cream APPLY EVERY DAY TO FOOT, Historical Med      clobetasol (TEMOVATE) 0 05 % external solution Apply topically  , Starting Fri 2/6/2015, Historical Med      clotrimazole-betamethasone (LOTRISONE) 1-0 05 % cream APPLY TWICE A DAY, Historical Med      docusate sodium (COLACE) 100 mg capsule Take 1 capsule (100 mg total) by mouth as needed in the morning and 1 capsule (100 mg total) as needed in the evening for constipation  , Starting Wed 5/18/2022, Until Fri 6/17/2022 at 2359, Normal      EPINEPHrine (EPIPEN) 0 3 mg/0 3 mL SOAJ Inject as directed  , Starting Thu 3/24/2011, Historical Med      fexofenadine (ALLEGRA) 180 MG tablet Take 180 mg by mouth daily, Historical Med      halobetasol (ULTRAVATE) 0 05 % cream APPLY TWICE A DAY, Historical Med      lisinopril (ZESTRIL) 10 mg tablet Take 10 mg by mouth daily, Historical Med      Magnesium 250 MG TABS Take by mouth in the morning, Historical Med      melatonin 1 mg Take 0 5 mg by mouth daily at bedtime  , Historical Med      mometasone (ELOCON) 0 1 % lotion One drop affected ear canal daily at bedtime when necessary itching, Normal      mometasone (NASONEX) 50 mcg/act nasal spray SPRAY 1 SPRAY INTO EACH NOSTRIL TWICE A DAY, Normal      NON FORMULARY 2 (two) times a day, Historical Med      polyethylene glycol (MIRALAX) 17 g packet Take 17 g by mouth daily, Historical Med      Probiotic Product (PROBIOTIC PO) Take by mouth in the morning, Historical Med      rOPINIRole (REQUIP) 0 25 mg tablet Takes at bedtime between 1-4  0 25 mg tablets tablets per night, Normal      tadalafil (CIALIS) 5 MG tablet Take 5 mg by mouth daily, Starting Wed 10/13/2021, Historical Med      tamsulosin (FLOMAX) 0 4 mg Take 2 capsules (0 8 mg total) by mouth daily with dinner, Starting Fri 8/19/2022, Normal      triamcinolone (KENALOG) 0 1 % cream APPLY TWICE A DAY, Historical Med      Tuberculin-Allergy Syringes (B-D ALLERGY SYRINGE 1CC/28G) 28G X 1/2" 1 ML MISC 3 syringes Parra every 3 weeks, Normal      zolpidem (AMBIEN) 5 mg tablet Take 5 mg by mouth daily as needed, Starting Wed 7/20/2022, Historical Med             No discharge procedures on file      PDMP Review     None          ED Provider  Electronically Signed by           Kallie Graves PA-C  08/21/22 5812

## 2022-08-21 NOTE — TELEPHONE ENCOUNTER
Regarding: Having problems urinating  ----- Message from Andres Umana sent at 8/21/2022  9:35 AM EDT -----  "I'm having trouble going to the bathroom   The last time I urinated was 10pm last night "

## 2022-08-21 NOTE — TELEPHONE ENCOUNTER
Pt will be going to [facility]  Reason for Disposition   [1] Unable to urinate (or only a few drops) > 4 hours AND [2] bladder feels very full (e g , palpable bladder or strong urge to urinate)    Answer Assessment - Initial Assessment Questions  1  SYMPTOM: "What's the main symptom you're concerned about?" (e g , frequency, incontinence)      Can't pee  2  ONSET: "When did the    start?"      Last night   3  PAIN: "Is there any pain?" If Yes, ask: "How bad is it?" (Scale: 1-10; mild, moderate, severe)      No pain  4   CAUSE: "What do you think is causing the symptoms?"      BPH  5  OTHER SYMPTOMS: "Do you have any other symptoms?" (e g , fever, flank pain, blood in urine, pain with urination)      no    Protocols used: [facility]

## 2022-08-21 NOTE — ED NOTES
Pt and his wife are asking to leave since the pt was able to urinate a total of 370ml and the pt does not want the dickey  We have asked that they please wait for urine results to come back and also a bladder scan  They are agreeable at this time        Pooja Allen RN  08/21/22 7375

## 2022-08-22 LAB — BACTERIA UR CULT: NORMAL

## 2022-08-22 NOTE — TELEPHONE ENCOUNTER
Returned call to patient   Patient states that he Is voiding better  He feels like he passed something  Urine is bryan in color  Appt given with provider for 8/23/22   Ed precautions for decreased stream / or no urination

## 2022-08-22 NOTE — TELEPHONE ENCOUNTER
Per urology, Albert Borrego, "Work him up for infection with labs, UA & culture  Unless renal function is impaired no need fir imagong  But if off then non contrast CT  Thanks  Send him with dickey will arrange follow up "    4483 Patient currently declining dickey catheter  Per nurse, patient did void 370cc  Urine sent   Will bladder scan again   1418 105ml repeat bladder scan

## 2022-08-23 NOTE — TELEPHONE ENCOUNTER
Call placed to patient and spoke with him  Informed him of the recommendations of the CRNP  Pt is aware and will follow up on Friday as scheduled

## 2022-08-23 NOTE — TELEPHONE ENCOUNTER
Pt called and stated he woke up today with a fever and r/s for this Friday  Pt tested himself of covid and was negative   Pt would like to know if he should continue taking the antibiotic that was given to him in the ED    Pt call [phone]

## 2022-08-23 NOTE — TELEPHONE ENCOUNTER
Results of final urine culture were negative for infection  Patient does not need to continue antibiotic

## 2022-08-26 ENCOUNTER — OFFICE VISIT (OUTPATIENT)
Dept: UROLOGY | Facility: MEDICAL CENTER | Age: 62
End: 2022-08-26
Payer: COMMERCIAL

## 2022-08-26 VITALS — WEIGHT: 221.8 LBS | HEIGHT: 70 IN | HEART RATE: 86 BPM | OXYGEN SATURATION: 97 % | BODY MASS INDEX: 31.75 KG/M2

## 2022-08-26 DIAGNOSIS — R33.9 RETENTION, URINE: Primary | ICD-10-CM

## 2022-08-26 DIAGNOSIS — N40.1 BENIGN PROSTATIC HYPERPLASIA WITH LOWER URINARY TRACT SYMPTOMS, SYMPTOM DETAILS UNSPECIFIED: ICD-10-CM

## 2022-08-26 DIAGNOSIS — R50.81 FEVER IN OTHER DISEASES: ICD-10-CM

## 2022-08-26 LAB — POST-VOID RESIDUAL VOLUME, ML POC: 26 ML

## 2022-08-26 PROCEDURE — 51798 US URINE CAPACITY MEASURE: CPT | Performed by: UROLOGY

## 2022-08-26 PROCEDURE — 99213 OFFICE O/P EST LOW 20 MIN: CPT | Performed by: UROLOGY

## 2022-08-26 RX ORDER — TOLTERODINE 2 MG/1
2 CAPSULE, EXTENDED RELEASE ORAL DAILY
Qty: 30 CAPSULE | Refills: 4 | Status: SHIPPED | OUTPATIENT
Start: 2022-08-26 | End: 2022-08-29

## 2022-08-26 NOTE — PROGRESS NOTES
HISTORY:    Now three months and TURP, 7 g BPH  That was a redo after Uro lift by another doctor in 2019 that did not seem to help the patient     All long his problems were diminished flow, and urgency frequency nocturia times 3-6  After our surgery, his flow is improved, but he has been plagued by periods of time or during which, for several days at a time, he will have increased frequency urgency tried void not be able to get the flow going and be quite worried about it  ER visit one week ago for above symptoms, they gave him antibiotics for possible infection although cultures negative  They also scanned in for 270 mL PVR, but he voided 370 just a few minutes later     All of our scans have been low 100 mL since our procedure     Other issues is: one micro focus of prostate cancer Kari score six found on the biopsy, we will keep him on active surveillance         ASSESSMENT / PLAN:    Need Uro lift nor TURP seemed to help his crit chronic frequency and urge incontinence  He does a much better flow now  Will start Detrol 2 mg before bed, warned about side effects  I encouraged him to not get worried if he has an urge to voiding can not get the stream started  It would only be a concern if he really went many hours without voiding  The following portions of the patient's history were reviewed and updated as appropriate: allergies, current medications, past family history, past medical history, past social history, past surgical history and problem list     Review of Systems   All other systems reviewed and are negative  Objective:     Physical Exam  Constitutional:       General: He is not in acute distress  Appearance: He is well-developed  He is not diaphoretic  HENT:      Head: Normocephalic and atraumatic  Eyes:      General: No scleral icterus  Pulmonary:      Effort: Pulmonary effort is normal    Skin:     Coloration: Skin is not pale     Neurological: Mental Status: He is alert and oriented to person, place, and time  Psychiatric:         Behavior: Behavior normal          Thought Content: Thought content normal          Judgment: Judgment normal            0   Lab Value Date/Time    PSA 0 5 12/12/2013 1546   ]  BUN   Date Value Ref Range Status   08/21/2022 15 5 - 25 mg/dL Final   09/29/2015 15 5 - 25 mg/dL Final     Creatinine   Date Value Ref Range Status   08/21/2022 1 03 0 60 - 1 30 mg/dL Final     Comment:     Standardized to IDMS reference method   09/29/2015 0 99 0 60 - 1 30 mg/dL Final     Comment:     Standardized to IDMS reference method     No components found for: CBC      Patient Active Problem List   Diagnosis    Restless leg syndrome    Snoring    Class 2 obesity due to excess calories without serious comorbidity with body mass index (BMI) of 35 0 to 35 9 in adult    BPH with urinary obstruction    HTN (hypertension)    Hematuria    RAIMUNDO (obstructive sleep apnea)    [facility]        Diagnoses and all orders for this visit:    Retention, urine  -     POCT Measure PVR    Benign prostatic hyperplasia with lower urinary tract symptoms, symptom details unspecified  -     POCT Measure PVR  -     tolterodine (DETROL LA) 2 mg 24 hr capsule; Take 1 capsule (2 mg total) by mouth daily  -     CBC and Platelet; Future    Fever in other diseases  -     CBC and Platelet; Future           Patient ID: Christopher Lu is a 59 y o  male        Current Outpatient Medications:     aspirin (ECOTRIN LOW STRENGTH) 81 mg EC tablet, Take 81 mg by mouth daily, Disp: , Rfl:     atorvastatin (LIPITOR) 10 mg tablet, Take 10 mg by mouth daily, Disp: , Rfl:     betamethasone dipropionate (DIPROSONE) 0 05 % cream, APPLY EVERY DAY TO FOOT, Disp: , Rfl: 5    cephalexin (KEFLEX) 500 mg capsule, Take 1 capsule (500 mg total) by mouth every 6 (six) hours for 7 days, Disp: 28 capsule, Rfl: 0    clotrimazole-betamethasone (LOTRISONE) 1-0 05 % cream, APPLY TWICE A DAY, Disp: , Rfl: 5    EPINEPHrine (EPIPEN) 0 3 mg/0 3 mL SOAJ, Inject as directed  , Disp: , Rfl:     halobetasol (ULTRAVATE) 0 05 % cream, APPLY TWICE A DAY, Disp: , Rfl: 5    lisinopril (ZESTRIL) 10 mg tablet, Take 10 mg by mouth daily, Disp: , Rfl:     melatonin 1 mg, Take 0 5 mg by mouth daily at bedtime  , Disp: , Rfl:     mometasone (NASONEX) 50 mcg/act nasal spray, SPRAY 1 SPRAY INTO EACH NOSTRIL TWICE A DAY, Disp: 51 g, Rfl: 3    polyethylene glycol (MIRALAX) 17 g packet, Take 17 g by mouth daily, Disp: , Rfl:     Probiotic Product (PROBIOTIC PO), Take by mouth in the morning, Disp: , Rfl:     rOPINIRole (REQUIP) 0 25 mg tablet, Takes at bedtime between 1-4  0 25 mg tablets tablets per night, Disp: 360 tablet, Rfl: 2    tadalafil (CIALIS) 5 MG tablet, Take 5 mg by mouth daily, Disp: , Rfl:     tamsulosin (FLOMAX) 0 4 mg, Take 2 capsules (0 8 mg total) by mouth daily with dinner, Disp: 180 capsule, Rfl: 3    tolterodine (DETROL LA) 2 mg 24 hr capsule, Take 1 capsule (2 mg total) by mouth daily, Disp: 30 capsule, Rfl: 4    triamcinolone (KENALOG) 0 1 % cream, APPLY TWICE A DAY, Disp: , Rfl: 5    Tuberculin-Allergy Syringes (B-D ALLERGY SYRINGE 1CC/28G) 28G X 1/2" 1 ML MISC, 3 syringes Parra every 3 weeks, Disp: 50 each, Rfl: 1    zolpidem (AMBIEN) 5 mg tablet, Take 5 mg by mouth daily as needed, Disp: , Rfl:     clobetasol (TEMOVATE) 0 05 % external solution, Apply topically   (Patient not taking: No sig reported), Disp: , Rfl:     docusate sodium (COLACE) 100 mg capsule, Take 1 capsule (100 mg total) by mouth as needed in the morning and 1 capsule (100 mg total) as needed in the evening for constipation   (Patient not taking: No sig reported), Disp: 30 capsule, Rfl: 0    fexofenadine (ALLEGRA) 180 MG tablet, Take 180 mg by mouth daily (Patient not taking: No sig reported), Disp: , Rfl:     Magnesium 250 MG TABS, Take by mouth in the morning (Patient not taking: No sig reported), Disp: , Rfl:     mometasone (ELOCON) 0 1 % lotion, One drop affected ear canal daily at bedtime when necessary itching, Disp: 60 mL, Rfl: 0    NON FORMULARY, 2 (two) times a day (Patient not taking: No sig reported), Disp: , Rfl:     Past Medical History:   Diagnosis Date    Eczema     Enlarged prostate     Environmental allergies     Hypertension     Kidney cysts     Kidney stone     Prediabetes     RLS (restless legs syndrome)        Past Surgical History:   Procedure Laterality Date    COLONOSCOPY      HEMORROIDECTOMY      KIDNEY STONE SURGERY      NASAL SEPTUM SURGERY      IL CYSTOURETHRO W/IMPLANT N/A 3/18/2021    Procedure: CYSTOSCOPY WITH INSERTION UROLIFT;  Surgeon: Kevin De Jesus DO;  [facility];  Service: Urology    IL CYSTOURETHROSCOPY W/IRRIG & EVAC CLOTS N/A 3/25/2021    Procedure: CYSTOSCOPY COMPLICATED EVACUATION OF CLOTS; FULGERATION OF BLADDERNECK, PROSTATIC URETHRA;  Surgeon: Kevin De Jesus DO;  [facility];  Service: Urology    IL TRANSURETHRAL ELEC-SURG PROSTATECTOM N/A 5/17/2022    Procedure: TURP;  Surgeon: Sheron Hester MD;  [facility];  Service: Urology    TONSILLECTOMY      URETERAL EXPLORATION      stricture    UVULOPALATOPHARYNGOPLASTY      VARICOSE VEIN SURGERY      VASCULAR SURGERY      varicose vein removal       Social History

## 2022-08-28 DIAGNOSIS — N40.1 BENIGN PROSTATIC HYPERPLASIA WITH LOWER URINARY TRACT SYMPTOMS, SYMPTOM DETAILS UNSPECIFIED: ICD-10-CM

## 2022-08-29 RX ORDER — TOLTERODINE 2 MG/1
CAPSULE, EXTENDED RELEASE ORAL
Qty: 90 CAPSULE | Refills: 2 | Status: SHIPPED | OUTPATIENT
Start: 2022-08-29 | End: 2022-09-22

## 2022-09-21 DIAGNOSIS — N40.1 BENIGN PROSTATIC HYPERPLASIA WITH LOWER URINARY TRACT SYMPTOMS, SYMPTOM DETAILS UNSPECIFIED: ICD-10-CM

## 2022-09-22 RX ORDER — TOLTERODINE 2 MG/1
CAPSULE, EXTENDED RELEASE ORAL
Qty: 90 CAPSULE | Refills: 3 | Status: SHIPPED | OUTPATIENT
Start: 2022-09-22

## 2023-02-19 DIAGNOSIS — G25.81 RESTLESS LEG SYNDROME: ICD-10-CM

## 2023-02-20 NOTE — TELEPHONE ENCOUNTER
Last office visit 1/31/2022  Next office visit 3/1/2023  Dr Ron Cardoza, please review and sign if appropriate  Thank you

## 2023-02-23 RX ORDER — ROPINIROLE 0.25 MG/1
TABLET, FILM COATED ORAL
Qty: 360 TABLET | Refills: 0 | Status: SHIPPED | OUTPATIENT
Start: 2023-02-23

## 2023-04-24 ENCOUNTER — OFFICE VISIT (OUTPATIENT)
Dept: URGENT CARE | Facility: MEDICAL CENTER | Age: 63
End: 2023-04-24

## 2023-04-24 VITALS
RESPIRATION RATE: 18 BRPM | HEART RATE: 88 BPM | SYSTOLIC BLOOD PRESSURE: 136 MMHG | DIASTOLIC BLOOD PRESSURE: 84 MMHG | TEMPERATURE: 99.1 F | OXYGEN SATURATION: 96 %

## 2023-04-24 DIAGNOSIS — M54.50 ACUTE RIGHT-SIDED LOW BACK PAIN WITHOUT SCIATICA: Primary | ICD-10-CM

## 2023-04-24 RX ORDER — TESTOSTERONE 16.2 MG/G
GEL TRANSDERMAL
COMMUNITY

## 2023-04-24 RX ORDER — METHOCARBAMOL 500 MG/1
500 TABLET, FILM COATED ORAL 3 TIMES DAILY
Qty: 45 TABLET | Refills: 0 | Status: SHIPPED | OUTPATIENT
Start: 2023-04-24

## 2023-04-24 NOTE — PROGRESS NOTES
[address] Now        NAME: Cyrilla Severance is a 57 y o  male  : 1960    MRN: [id number]  DATE: 2023  TIME: 8:56 AM    Assessment and Plan   Acute right-sided low back pain without sciatica [M54 50]  1  Acute right-sided low back pain without sciatica  diclofenac sodium (VOLTAREN) 50 mg EC tablet    methocarbamol (ROBAXIN) 500 mg tablet            Patient Instructions       Follow up with PCP as needed  Chief Complaint     Chief Complaint   Patient presents with   • Back Pain     Pt C/O back pain that started Wednesday during landscaping and picking up a bag of rocks  History of Present Illness       Pt with 5 day hx of LBP  Pt lifted rocks into car and twisted and felt pop  C/O pain and spasm on the RT  Back Pain  This is a new problem  The problem occurs constantly  The problem is unchanged  The pain is present in the lumbar spine  The quality of the pain is described as aching  The pain does not radiate  The pain is at a severity of 3/10  The symptoms are aggravated by bending and twisting  Pertinent negatives include no abdominal pain, bladder incontinence, bowel incontinence, chest pain, dysuria, fever, headaches, leg pain, numbness, paresis, paresthesias, pelvic pain, perianal numbness, tingling, weakness or weight loss  He has tried analgesics and ice for the symptoms  The treatment provided no relief  Review of Systems   Review of Systems   Constitutional: Negative for fever and weight loss  Cardiovascular: Negative for chest pain  Gastrointestinal: Negative for abdominal pain and bowel incontinence  Genitourinary: Negative for bladder incontinence, dysuria and pelvic pain  Musculoskeletal: Positive for back pain  Neurological: Negative for tingling, weakness, numbness, headaches and paresthesias  All other systems reviewed and are negative          Current Medications       Current Outpatient Medications:   •  aspirin (ECOTRIN LOW STRENGTH) 81 mg "EC tablet, Take 81 mg by mouth daily, Disp: , Rfl:   •  atorvastatin (LIPITOR) 10 mg tablet, Take 10 mg by mouth daily, Disp: , Rfl:   •  diclofenac sodium (VOLTAREN) 50 mg EC tablet, Take 1 tablet (50 mg total) by mouth 2 (two) times a day, Disp: 30 tablet, Rfl: 0  •  halobetasol (ULTRAVATE) 0 05 % cream, APPLY TWICE A DAY, Disp: , Rfl: 5  •  lisinopril (ZESTRIL) 10 mg tablet, Take 10 mg by mouth daily, Disp: , Rfl:   •  melatonin 1 mg, Take 0 5 mg by mouth daily at bedtime  , Disp: , Rfl:   •  methocarbamol (ROBAXIN) 500 mg tablet, Take 1 tablet (500 mg total) by mouth 3 (three) times a day, Disp: 45 tablet, Rfl: 0  •  mometasone (NASONEX) 50 mcg/act nasal spray, USE 1 SPRAY INTO EACH NOSTRIL TWICE A DAY, Disp: 51 g, Rfl: 3  •  Probiotic Product (PROBIOTIC PO), Take by mouth in the morning, Disp: , Rfl:   •  rOPINIRole (REQUIP) 0 25 mg tablet, Takes at bedtime between 1-4  0 25 mg tablets tablets per night, Disp: 360 tablet, Rfl: 0  •  tadalafil (CIALIS) 5 MG tablet, Take 5 mg by mouth daily, Disp: , Rfl:   •  tamsulosin (FLOMAX) 0 4 mg, Take 2 capsules (0 8 mg total) by mouth daily with dinner, Disp: 180 capsule, Rfl: 3  •  testosterone (ANDROGEL) 1 62 % TD gel pump, AndroGel 20 25 mg/1 25 gram per pump act   (1 62 %) transdermal gel, Disp: , Rfl:   •  tolterodine (DETROL LA) 2 mg 24 hr capsule, TAKE 1 CAPSULE BY MOUTH EVERY DAY, Disp: 90 capsule, Rfl: 3  •  triamcinolone (KENALOG) 0 1 % cream, APPLY TWICE A DAY, Disp: , Rfl: 5  •  Tuberculin-Allergy Syringes (B-D ALLERGY SYRINGE 1CC/28G) 28G X 1/2\" 1 ML MISC, 3 syringes Parra every 3 weeks, Disp: 50 each, Rfl: 1  •  zolpidem (AMBIEN) 5 mg tablet, Take 5 mg by mouth daily as needed, Disp: , Rfl:   •  betamethasone dipropionate (DIPROSONE) 0 05 % cream, APPLY EVERY DAY TO FOOT (Patient not taking: Reported on 4/24/2023), Disp: , Rfl: 5  •  clobetasol (TEMOVATE) 0 05 % external solution, Apply topically   (Patient not taking: No sig reported), Disp: , Rfl:   •  " clotrimazole-betamethasone (LOTRISONE) 1-0 05 % cream, APPLY TWICE A DAY (Patient not taking: Reported on 4/24/2023), Disp: , Rfl: 5  •  docusate sodium (COLACE) 100 mg capsule, Take 1 capsule (100 mg total) by mouth as needed in the morning and 1 capsule (100 mg total) as needed in the evening for constipation   (Patient not taking: No sig reported), Disp: 30 capsule, Rfl: 0  •  EPINEPHrine (EPIPEN) 0 3 mg/0 3 mL SOAJ, Inject as directed   (Patient not taking: Reported on 4/24/2023), Disp: , Rfl:   •  fexofenadine (ALLEGRA) 180 MG tablet, Take 180 mg by mouth daily (Patient not taking: No sig reported), Disp: , Rfl:   •  Magnesium 250 MG TABS, Take by mouth in the morning (Patient not taking: Reported on 5/20/2022), Disp: , Rfl:   •  mometasone (ELOCON) 0 1 % lotion, One drop affected ear canal daily at bedtime when necessary itching (Patient not taking: Reported on 4/24/2023), Disp: 60 mL, Rfl: 0  •  NON FORMULARY, 2 (two) times a day (Patient not taking: No sig reported), Disp: , Rfl:   •  polyethylene glycol (MIRALAX) 17 g packet, Take 17 g by mouth daily, Disp: , Rfl:     Current Allergies     Allergies as of 04/24/2023 - Reviewed 04/24/2023   Allergen Reaction Noted   • Latex Itching 04/30/2019   • Other Other (See Comments)    • Psyllium Hives and Other (See Comments) 01/20/2022   • Telithromycin Other (See Comments) 12/11/2012   • Adhesive [medical tape] Rash 03/13/2017            The following portions of the patient's history were reviewed and updated as appropriate: allergies, current medications, past family history, past medical history, past social history, past surgical history and problem list      Past Medical History:   Diagnosis Date   • Eczema    • Enlarged prostate    • Environmental allergies    • Hypertension    • Kidney cysts    • Kidney stone    • Prediabetes    • RLS (restless legs syndrome)        Past Surgical History:   Procedure Laterality Date   • COLONOSCOPY     • HEMORROIDECTOMY     • KIDNEY STONE SURGERY     • NASAL SEPTUM SURGERY     • AK CYSTO INSERTION TRANSPROSTATIC IMPLANT SINGLE N/A 3/18/2021    Procedure: CYSTOSCOPY WITH INSERTION UROLIFT;  Surgeon: Sadaf Hernandez DO;  [facility];  Service: Urology   • AK CYSTO W/IRRIG & EVAC MULTPLE OBSTRUCTING CLOTS N/A 3/25/2021    Procedure: CYSTOSCOPY COMPLICATED EVACUATION OF CLOTS; FULGERATION OF BLADDERNECK, PROSTATIC URETHRA;  Surgeon: Sadaf Hernandez DO;  [facility];  Service: Urology   • AK TRURL ELECTROSURG [facility] PROSTATE BLEED COMPLETE N/A 5/17/2022    Procedure: TURP;  Surgeon: Norma Henry MD;  [facility];  Service: Urology   • TONSILLECTOMY     • URETERAL EXPLORATION      stricture   • UVULOPALATOPHARYNGOPLASTY     • VARICOSE VEIN SURGERY     • VASCULAR SURGERY      varicose vein removal       Family History   Problem Relation Age of Onset   • Stroke Mother    • Lung cancer Father    • Cancer Father    • Other Maternal Grandmother         Cardiac   • Diabetes Paternal Grandmother    • Other Paternal Grandfather         Cardiac         Medications have been verified  Objective   /84 (BP Location: Left arm, Patient Position: Sitting, Cuff Size: Large)   Pulse 88   Temp 99 1 °F (37 3 °C) (Tympanic)   Resp 18   SpO2 96%   No LMP for male patient  Physical Exam     Physical Exam  Vitals and nursing note reviewed  Constitutional:       Appearance: Normal appearance  He is obese  Cardiovascular:      Rate and Rhythm: Normal rate and regular rhythm  Pulses: Normal pulses  Heart sounds: Normal heart sounds  Pulmonary:      Effort: Pulmonary effort is normal       Breath sounds: Normal breath sounds  Neurological:      General: No focal deficit present  Mental Status: He is alert and oriented to person, place, and time  Sensory: No sensory deficit  Motor: No weakness        Deep Tendon Reflexes: Reflexes normal    Psychiatric:         Mood and Affect: Mood normal  Behavior: Behavior normal        Back-spasm at RT L4-5 and tenderness to palpation  Decreased flexion by 15 degrees  Full ROM

## 2023-05-15 ENCOUNTER — TELEPHONE (OUTPATIENT)
Dept: UROLOGY | Facility: AMBULATORY SURGERY CENTER | Age: 63
End: 2023-05-15

## 2023-05-15 NOTE — TELEPHONE ENCOUNTER
I spoke with Pebbles Menendez and let her know he had a Urolift years ago and then a TURP done since the Urolift did not work

## 2023-05-15 NOTE — TELEPHONE ENCOUNTER
Pt under care of: Dr Yajaira Hernandez    Pt left voicemail stating: Mookie from [facility] is asking if pt has urolift currently or if was removed pt is scheduled for upcoming imaging  Pt told Mookie he did have a urolift but was removed       Call back-[phone] Mookie

## 2023-07-20 DIAGNOSIS — R39.15 URGENCY OF URINATION: ICD-10-CM

## 2023-07-20 RX ORDER — TAMSULOSIN HYDROCHLORIDE 0.4 MG/1
0.8 CAPSULE ORAL
Qty: 180 CAPSULE | Refills: 0 | Status: SHIPPED | OUTPATIENT
Start: 2023-07-20

## 2023-10-23 DIAGNOSIS — R39.15 URGENCY OF URINATION: ICD-10-CM

## 2023-10-23 RX ORDER — TAMSULOSIN HYDROCHLORIDE 0.4 MG/1
0.8 CAPSULE ORAL
Qty: 60 CAPSULE | Refills: 0 | Status: SHIPPED | OUTPATIENT
Start: 2023-10-23

## 2023-11-10 NOTE — ASSESSMENT & PLAN NOTE
56year old male with BPH / bladder outlet obstructive symproms refractory to maximum dose of alpha blocker therapy who recently underwent cystoscopy with insertion urolift implants presented due to hematuria  Hematuria possibly due to BPH / recent urolift  He underwent cystoscopy this afternoon to evacuate clots, fulgeration of bladder neck / prostatic urethra  - Will await urology reevaluation prior to discharge consideration  Chief Complaint   Patient presents with    Follow-Up     LAST SEEN 11/01/2022 ILIR VALENCIA  1YR. F/V ANNUAL VISIT       HPI:  Singh Torres is a 52 y.o. year old male here today for follow-up on NATALIE.  Last seen 11/1/2022 by Dr. Valencia.  Patient has previously used a fullface mask but is currently using nasal pillows with heated tubing.  He does experience frequent nosebleeds.  He does have a history of migraines and is currently on Ajovy injections.  He does feel like there is often not enough air coming through the CPAP.  He will eventually rip the mask off during the midnight.  He averages 7 hours of sleep and does take Mirapex and Ambien to help fall asleep.  Also has restless leg therefore takes Mirapex for that.  Overall he notes improvement in sleep quality when using CPAP and denies any excessive daytime sleepiness, morning headaches, palpitations, concentration or memory problems.    Most recent 30-day compliance shows only 9 days of use with an average time of 6 hours and 23 minutes and a resultant AHI of 5.5 with evidence of mask leak with a median of 8.1 L/min and 95th percentile of 28.3 L/min.  Patient does not use chinstrap with nasal pillows.      Home sleep study (7/3/2023):  Mild Obstructive Sleep Apnea Hypopnea -   overall  AHI  5.0, AHI in REM  12. No significant nocturnal oxygen desaturation - mauro saturation 85% - saturations <88% below for 3.4 minutes of TST.     ROS: As per HPI and otherwise negative if not stated.    Past Medical History:   Diagnosis Date    Arthritis     back, arm    ASTHMA     Backpain 01/01/1991    Bowel habit changes     diarrhea    Breath shortness     in the past    Dental disorder     rotting away    Elbow injury     Head injury 01/01/2004    Heart burn     Hemorrhagic disorder (HCC)     gums    Indigestion     Migraine with aura     NATALIE (obstructive sleep apnea) 01/30/2012    bipap    Pneumonia 01/01/2004    Restless leg syndrome     Sleep apnea     Snoring      "Vertigo        Past Surgical History:   Procedure Laterality Date    TURBINOPLASTY Right 6/28/2016    Procedure: TURBINOPLASTY;  Surgeon: Zaki Yu M.D.;  Location: SURGERY [facility] DAY [facility];  Service:     OTHER  2007    colonoscopy    OTHER  2006    Nasal - deviated septum       Family History   Problem Relation Age of Onset    Cancer Paternal Grandmother         breast       Allergies as of 11/10/2023    (No Known Allergies)        Vitals:  /82 (BP Location: Left arm, Patient Position: Sitting, BP Cuff Size: Adult)   Pulse 99   Resp 16   Ht 1.753 m (5' 9\")   Wt 79.5 kg (175 lb 3.2 oz)   SpO2 95%     Current medications as of today   Current Outpatient Medications   Medication Sig Dispense Refill    AJOVY 225 MG/1.5ML Solution Prefilled Syringe INJECT 225 MG UNDER THE SKIN EVERY MONTH AS DIRECTED      solifenacin (VESICARE) 10 MG tablet Take 10 mg by mouth every day.      metoprolol SR (TOPROL XL) 25 MG TABLET SR 24 HR Take 25 mg by mouth every day.      amLODIPine (NORVASC) 10 MG Tab Take 10 mg by mouth every day.      pantoprazole (PROTONIX) 40 MG Tablet Delayed Response Take 40 mg by mouth every morning before breakfast. 30 minutes before breakfast      quetiapine (SEROQUEL) 400 MG tablet Take 800 mg by mouth at bedtime.      tadalafil (CIALIS) 5 MG tablet tadalafil 5 mg tablet   Take 1 tablet every day by oral route for 90 days.      Eluxadoline (VIBERZI) 100 MG Tab Take  by mouth.      escitalopram (LEXAPRO) 10 MG Tab Take 10 mg by mouth every day.      tamsulosin (FLOMAX) 0.4 MG capsule Take 0.4 mg by mouth 1/2 hour after breakfast.      meclizine (ANTIVERT) 25 MG Tab Take 1 Tab by mouth 3 times a day as needed. 30 Tab 0    albuterol (PROVENTIL) 2.5mg/3ml Nebu Soln solution for nebulization 2.5 mg by Nebulization route every four hours as needed for Shortness of Breath.      hydrocodone/acetaminophen (NORCO)  MG Tab Take 1 Tab by mouth every four hours as needed. Indications: " Moderate to Moderately Severe Pain      albuterol (VENTOLIN OR PROVENTIL) 108 (90 BASE) MCG/ACT Aero Soln inhalation aerosol Inhale 2 Puffs by mouth every four hours as needed for Shortness of Breath.      sumatriptan (IMITREX) 100 MG tablet Take 100 mg by mouth Once PRN for Migraine.      topiramate (TOPAMAX) 50 MG tablet Take 100 mg by mouth every bedtime.      zolpidem (AMBIEN) 10 MG Tab Take 10 mg by mouth at bedtime as needed for Sleep.      ondansetron (ZOFRAN) 4 MG TABS Take 1 Tab by mouth every four hours as needed for Nausea/Vomiting. 20 Tab 0    pramipexole (MIRAPEX) 0.25 MG Tab Take 0.25 mg by mouth every bedtime.      benzonatate (TESSALON) 200 MG capsule Take 200 mg by mouth 3 times a day as needed. (Patient not taking: Reported on 11/10/2023)      ketorolac (TORADOL) 60 MG/2ML Solution  (Patient not taking: Reported on 11/10/2023)      chlorhexidine (PERIDEX) 0.12 % Solution chlorhexidine gluconate 0.12 % mouthwash (Patient not taking: Reported on 11/10/2023)      lidocaine (XYLOCAINE) 2 % Solution Take 15 mL by mouth every four hours as needed for Throat/Mouth Pain. Gargle and spit every 4 hours as needed (Patient not taking: Reported on 11/10/2023) 120 mL 0    promethazine-dextromethorphan (PROMETHAZINE-DM) 6.25-15 MG/5ML syrup Take 5 mL by mouth every four hours as needed for Cough. (Patient not taking: Reported on 11/10/2023) 120 mL 0    dicyclomine (BENTYL) 20 MG Tab Take 20 mg by mouth every 6 hours. (Patient not taking: Reported on 11/10/2023)      gabapentin (NEURONTIN) 300 MG Cap Take 300 mg by mouth 3 times a day. (Patient not taking: Reported on 11/10/2023)      aripiprazole (ABILIFY) 10 MG Tab Take 10 mg by mouth every day. (Patient not taking: Reported on 11/10/2023)       No current facility-administered medications for this visit.         Physical Exam:   Gen:           Alert and oriented, No apparent distress. Mood and affect appropriate, normal interaction with examiner.  Eyes:           PERRL, EOM intact, sclere white, conjunctive moist.  Ears:          Not examined.   Hearing:     Grossly intact.  Nose:          Normal, no lesions or deformities.  Dentition:    Good dentition.  Oropharynx:   Tongue normal, posterior pharynx without erythema or exudate.  Neck:        Supple, trachea midline, no masses.  Respiratory Effort: No intercostal retractions or use of accessory muscles.   Lung Auscultation:      Clear to auscultation bilaterally; no rales, rhonchi or wheezing.  CV:            Regular rate and rhythm. No murmurs, rubs or gallops.  Abd:           Not examined.   Lymphadenopathy: Not examined.  Gait and Station: Normal.  Digits and Nails: No clubbing, cyanosis, petechiae, or nodes.   Cranial Nerves: II-XII grossly intact.  Skin:        No rashes, lesions or ulcers noted.               Ext:           No cyanosis or edema.      Assessment:  1. NATALIE on CPAP  DME Mask and Supplies            Plan:  Reviewed compliance.  Patient complains of air hunger and rips mask off because air pressure is too low.  Adjusted CPAP to auto CPAP 8 to 12 cm/H2O.  Patient advised to consider obtaining chinstrap to see if mask leak is improved.  Otherwise advised patient to change to a fullface mask.  Advise annual follow-up  Order placed for mask and supplies which will be good for 1 year.  Please note that this dictation was created using voice recognition software. I have made every reasonable attempt to correct obvious errors, but it is possible there are errors of grammar and possibly content that I did not discover before finalizing the note.

## 2024-02-15 NOTE — PROGRESS NOTES
Urology  Postoperative day 1  Status post complicated clot evacuation, fulguration of prosthetic urethra and bladder neck  Postoperative day 8   Status post UroLift implantation  Patient voices no complaints  Abdomen is soft nontender  without palpable distention  There is no penoscrotal edema  Twenty-[address] [place] 3 way silicone catheter is draining clear urine with continuous bladder irrigation essentially off  Diagnosis  Gross hematuria (resolved)  BPH  Urinary retention  Plan  DC CBI  Maintain Chambers  Continue alpha blockers and 5 alpha reductase inhibitors  Continue ciprofloxacin in view of the UroLift implants and indwelling Chambers catheter  Follow-up at the office Monday 3/29/2021 for Chambers catheter removal  Patient is stable urologically for discharge Negative

## 2024-09-07 DIAGNOSIS — R39.15 URGENCY OF URINATION: ICD-10-CM

## 2024-09-09 RX ORDER — TAMSULOSIN HYDROCHLORIDE 0.4 MG/1
0.8 CAPSULE ORAL
Qty: 60 CAPSULE | Refills: 0 | Status: SHIPPED | OUTPATIENT
Start: 2024-09-09

## 2024-10-03 DIAGNOSIS — R39.15 URGENCY OF URINATION: ICD-10-CM

## 2024-10-03 RX ORDER — TAMSULOSIN HYDROCHLORIDE 0.4 MG/1
0.8 CAPSULE ORAL
Qty: 180 CAPSULE | Refills: 1 | Status: SHIPPED | OUTPATIENT
Start: 2024-10-03

## 2024-11-20 DIAGNOSIS — N40.1 BENIGN PROSTATIC HYPERPLASIA (BPH) WITH URINARY URGENCY: Primary | ICD-10-CM

## 2024-11-20 DIAGNOSIS — R39.15 BENIGN PROSTATIC HYPERPLASIA (BPH) WITH URINARY URGENCY: Primary | ICD-10-CM

## 2024-11-20 DIAGNOSIS — R39.15 URGENCY OF URINATION: ICD-10-CM

## 2024-11-21 LAB — SL AMB PSA, TOTAL: 0.27 NG/ML

## 2024-11-25 ENCOUNTER — OFFICE VISIT (OUTPATIENT)
Dept: UROLOGY | Facility: MEDICAL CENTER | Age: 64
End: 2024-11-25
Payer: COMMERCIAL

## 2024-11-25 VITALS
DIASTOLIC BLOOD PRESSURE: 88 MMHG | BODY MASS INDEX: 28.14 KG/M2 | HEIGHT: 69 IN | OXYGEN SATURATION: 98 % | WEIGHT: 190 LBS | HEART RATE: 91 BPM | SYSTOLIC BLOOD PRESSURE: 150 MMHG

## 2024-11-25 DIAGNOSIS — N40.1 BENIGN PROSTATIC HYPERPLASIA (BPH) WITH URINARY URGENCY: Primary | ICD-10-CM

## 2024-11-25 DIAGNOSIS — Z85.46 HISTORY OF PROSTATE CANCER: ICD-10-CM

## 2024-11-25 DIAGNOSIS — N20.0 KIDNEY STONE: ICD-10-CM

## 2024-11-25 DIAGNOSIS — R39.15 BENIGN PROSTATIC HYPERPLASIA (BPH) WITH URINARY URGENCY: Primary | ICD-10-CM

## 2024-11-25 DIAGNOSIS — F40.10 SHY BLADDER SYNDROME: ICD-10-CM

## 2024-11-25 PROBLEM — F33.0 RECURRENT DEPRESSIVE DISORDER, CURRENT EPISODE MILD (HCC): Status: ACTIVE | Noted: 2021-08-27

## 2024-11-25 PROBLEM — M76.61 ACHILLES TENDINITIS OF RIGHT LOWER EXTREMITY: Status: ACTIVE | Noted: 2017-05-15

## 2024-11-25 PROBLEM — Z86.0100 HISTORY OF COLONIC POLYPS: Status: ACTIVE | Noted: 2019-03-01

## 2024-11-25 PROBLEM — E78.00 HYPERCHOLESTEROLEMIA: Status: ACTIVE | Noted: 2020-07-11

## 2024-11-25 LAB
POST-VOID RESIDUAL VOLUME, ML POC: 240 ML
SL AMB  POCT GLUCOSE, UA: NORMAL
SL AMB LEUKOCYTE ESTERASE,UA: NORMAL
SL AMB POCT BILIRUBIN,UA: NORMAL
SL AMB POCT BLOOD,UA: NORMAL
SL AMB POCT CLARITY,UA: CLEAR
SL AMB POCT COLOR,UA: YELLOW
SL AMB POCT KETONES,UA: NORMAL
SL AMB POCT NITRITE,UA: NORMAL
SL AMB POCT PH,UA: 6
SL AMB POCT SPECIFIC GRAVITY,UA: 1.01
SL AMB POCT URINE PROTEIN: NORMAL
SL AMB POCT UROBILINOGEN: 0.2

## 2024-11-25 PROCEDURE — 81003 URINALYSIS AUTO W/O SCOPE: CPT | Performed by: UROLOGY

## 2024-11-25 PROCEDURE — 82360 CALCULUS ASSAY QUANT: CPT | Performed by: UROLOGY

## 2024-11-25 PROCEDURE — 51798 US URINE CAPACITY MEASURE: CPT | Performed by: UROLOGY

## 2024-11-25 PROCEDURE — 99213 OFFICE O/P EST LOW 20 MIN: CPT | Performed by: UROLOGY

## 2024-11-25 RX ORDER — BUSPIRONE HYDROCHLORIDE 5 MG/1
5 TABLET ORAL 2 TIMES DAILY
COMMUNITY

## 2024-11-25 NOTE — PROGRESS NOTES
"   HISTORY:    1.  Follow-up for TURP in May 2022.  That was done for persistent obstruction after UroLift elsewhere.    Pathology was mainly BPH, but there was less than 1% of the tissue with [place] 6 cancer, on surveillance.    PSA has been low ever since, recently 0.2    2.  Episodic voiding dysfunction.    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  His wife says that he will not go outside the house for significant periods of time because of his inability to urinate elsewhere.    Could not give a urine in the office today    He says often the urine takes a long time to get started.    He is quite focused on it, related to his anxiety, if he has not voided for 2 to 3 hours, he gets quite worried and he always times his interval voiding.    Bladder scan shows 240 mL, he last urinated 5 hours ago at home.  So likely this represents a true PVR of 100 mL or less         ASSESSMENT / PLAN:    1.  PSA low, no concerns about the tiny bit of cancer was found    2.  Anxiety related show I bladder syndrome, that likely contributes to his difficulty eating the stream started on occasion.      Emptying adequately today.    3.  He has been recommended by PCP to see neurology.  I reinforced that and told him really he should have that appointment also.  He says he will comply and wife will make the call    Continue yearly follow-up with PSA        Review of Systems      Objective:     Physical Exam  Genitourinary:     Comments: Penis testes normal    Prostate minimally large no nodules          0   Lab Value Date/Time    PSA 0.27 11/21/2024 1004    PSA 0.5 12/12/2013 1546   ]  BUN   Date Value Ref Range Status   10/05/2023 14 7 - 28 mg/dL Final     Creatinine   Date Value Ref Range Status   10/05/2023 1.07 0.53 - 1.30 mg/dL Final     No components found for: \"CBC\"    Patient Active Problem List   Diagnosis    Restless leg syndrome    Snoring    Class 2 obesity due to excess calories without serious comorbidity with " body mass index (BMI) of 35.0 to 35.9 in adult    BPH with urinary obstruction    HTN (hypertension)    Hematuria    RAIMUNDO (obstructive sleep apnea)    Prostate cancer (HCC)    Achilles tendinitis of right lower extremity    History of colonic polyps    Hypercholesterolemia    Hypothyroidism    Recurrent depressive disorder, current episode mild (HCC)    Kidney stone        Patient ID: Juan Murray is a 64 y.o. male.      Current Outpatient Medications:     atorvastatin (LIPITOR) 10 mg tablet, Take 10 mg by mouth daily, Disp: , Rfl:     clotrimazole-betamethasone (LOTRISONE) 1-0.05 % cream, , Disp: , Rfl: 5    EPINEPHrine (EPIPEN) 0.3 mg/0.3 mL SOAJ, Inject as directed, Disp: , Rfl:     halobetasol (ULTRAVATE) 0.05 % cream, APPLY TWICE A DAY, Disp: , Rfl: 5    lisinopril (ZESTRIL) 10 mg tablet, Take 10 mg by mouth daily (Patient taking differently: Take 20 mg by mouth daily), Disp: , Rfl:     melatonin 1 mg, Take 0.5 mg by mouth daily at bedtime  , Disp: , Rfl:     mometasone (NASONEX) 50 mcg/act nasal spray, SPRAY 1 SPRAY INTO EACH NOSTRIL TWICE A DAY, Disp: 51 g, Rfl: 3    rOPINIRole (REQUIP) 0.25 mg tablet, Takes at bedtime between 1-4  0.25 mg tablets tablets per night, Disp: 360 tablet, Rfl: 0    tamsulosin (FLOMAX) 0.4 mg, TAKE 2 CAPSULES BY MOUTH DAILY WITH DINNER.., Disp: 180 capsule, Rfl: 1    zolpidem (AMBIEN) 5 mg tablet, Take 5 mg by mouth daily as needed, Disp: , Rfl:     betamethasone dipropionate (DIPROSONE) 0.05 % cream, APPLY EVERY DAY TO FOOT (Patient not taking: Reported on 4/24/2023), Disp: , Rfl: 5    busPIRone (BUSPAR) 5 mg tablet, Take 5 mg by mouth 2 (two) times a day, Disp: , Rfl:     clobetasol (TEMOVATE) 0.05 % external solution, Apply topically   (Patient not taking: Reported on 5/20/2022), Disp: , Rfl:     diclofenac sodium (VOLTAREN) 50 mg EC tablet, Take 1 tablet (50 mg total) by mouth 2 (two) times a day (Patient not taking: Reported on 11/25/2024), Disp: 30 tablet, Rfl: 0     "Magnesium 250 MG TABS, Take by mouth in the morning (Patient not taking: Reported on 11/25/2024), Disp: , Rfl:     mometasone (ELOCON) 0.1 % lotion, One drop affected ear canal daily at bedtime when necessary itching (Patient not taking: Reported on 4/24/2023), Disp: 60 mL, Rfl: 0    NON FORMULARY, 2 (two) times a day (Patient not taking: Reported on 7/28/2022), Disp: , Rfl:     Probiotic Product (PROBIOTIC PO), Take by mouth in the morning (Patient not taking: Reported on 11/25/2024), Disp: , Rfl:     triamcinolone (KENALOG) 0.1 % cream, APPLY TWICE A DAY (Patient not taking: Reported on 11/25/2024), Disp: , Rfl: 5    Tuberculin-Allergy Syringes (B-D ALLERGY SYRINGE 1CC/28G) 28G X 1/2\" 1 ML MISC, 3 syringes [place] every 3 weeks, Disp: 50 each, Rfl: 1                  "

## 2024-12-02 LAB
CALCIUM OXALATE DIHYDRATE MFR STONE IR: 90 %
COLOR STONE: NORMAL
COM MFR STONE: 10 %
COMMENT-STONE3: NORMAL
COMPOSITION: NORMAL
LABORATORY COMMENT REPORT: NORMAL
PHOTO: NORMAL
SIZE STONE: NORMAL MM
SPEC SOURCE SUBJ: NORMAL
STONE ANALYSIS-IMP: NORMAL
WT STONE: 10 MG

## 2025-03-10 DIAGNOSIS — R39.15 URGENCY OF URINATION: ICD-10-CM

## 2025-03-11 RX ORDER — TAMSULOSIN HYDROCHLORIDE 0.4 MG/1
0.8 CAPSULE ORAL
Qty: 180 CAPSULE | Refills: 1 | Status: SHIPPED | OUTPATIENT
Start: 2025-03-11

## 2025-07-15 DIAGNOSIS — Z85.46 HISTORY OF PROSTATE CANCER: ICD-10-CM

## 2025-07-15 DIAGNOSIS — R39.15 BENIGN PROSTATIC HYPERPLASIA (BPH) WITH URINARY URGENCY: Primary | ICD-10-CM

## 2025-07-15 DIAGNOSIS — N40.1 BENIGN PROSTATIC HYPERPLASIA (BPH) WITH URINARY URGENCY: Primary | ICD-10-CM

## (undated) DEVICE — GUARDIAN LVC: Brand: GUARDIAN

## (undated) DEVICE — SCD SEQUENTIAL COMPRESSION COMFORT SLEEVE MEDIUM KNEE LENGTH: Brand: KENDALL SCD

## (undated) DEVICE — Device

## (undated) DEVICE — SINGLE PORT MANIFOLD: Brand: NEPTUNE 2

## (undated) DEVICE — BAG URINE DRAINAGE 4000ML CONTINUOUS IRR

## (undated) DEVICE — Device: Brand: OLYMPUS

## (undated) DEVICE — GLOVE INDICATOR PI UNDERGLOVE SZ 7 BLUE

## (undated) DEVICE — GLOVE PI ULTRA TOUCH SZ.7.0

## (undated) DEVICE — BASIC SINGLE BASIN-LF: Brand: MEDLINE INDUSTRIES, INC.

## (undated) DEVICE — INVIEW CLEAR LEGGINGS: Brand: CONVERTORS

## (undated) DEVICE — TUBING SUCTION 5MM X 12 FT

## (undated) DEVICE — CATH FOLEY 20FR 5ML 2 WAY UNCOATED SILICONE

## (undated) DEVICE — EVACUATOR BLADDER ELLIK DISP STRL

## (undated) DEVICE — GAUZE SPONGES,16 PLY: Brand: CURITY

## (undated) DEVICE — 4-PORT MANIFOLD: Brand: NEPTUNE 2

## (undated) DEVICE — 1071 S-DRP URO STLE-GAMA 10/BX,4X/C: Brand: STERI-DRAPE™

## (undated) DEVICE — PREMIUM DRY TRAY LF: Brand: MEDLINE INDUSTRIES, INC.

## (undated) DEVICE — 3M™ STERI-STRIP™ COMPOUND BENZOIN TINCTURE 40 BAGS/CARTON 4 CARTONS/CASE C1544: Brand: 3M™ STERI-STRIP™

## (undated) DEVICE — PACK TUR

## (undated) DEVICE — CYSTO TUBING TUR Y IRRIGATION

## (undated) DEVICE — RESECTOSCOPE LOOP 28 FR

## (undated) DEVICE — PAD GROUNDING ADULT

## (undated) DEVICE — INTENDED FOR TISSUE SEPARATION, AND OTHER PROCEDURES THAT REQUIRE A SHARP SURGICAL BLADE TO PUNCTURE OR CUT.: Brand: BARD-PARKER ®  SAFETY SCALPED

## (undated) DEVICE — BAG URINE DRAINAGE LEG

## (undated) DEVICE — UROCATCH BAG

## (undated) DEVICE — BOVIE CORD DAC

## (undated) DEVICE — BAG URINE DRAINAGE 2000ML ANTI RFLX LF

## (undated) DEVICE — EXIDINE 4 PCT

## (undated) DEVICE — ARTHROSCOPY FLOOR MAT

## (undated) DEVICE — GLOVE PI ULTRA TOUCH SZ.6.5

## (undated) DEVICE — BAG DECANTER